# Patient Record
Sex: FEMALE | Race: OTHER | HISPANIC OR LATINO
[De-identification: names, ages, dates, MRNs, and addresses within clinical notes are randomized per-mention and may not be internally consistent; named-entity substitution may affect disease eponyms.]

---

## 2019-09-10 PROBLEM — Z00.00 ENCOUNTER FOR PREVENTIVE HEALTH EXAMINATION: Status: ACTIVE | Noted: 2019-09-10

## 2019-11-25 ENCOUNTER — APPOINTMENT (OUTPATIENT)
Dept: ENDOCRINOLOGY | Facility: CLINIC | Age: 44
End: 2019-11-25
Payer: COMMERCIAL

## 2019-11-25 VITALS
DIASTOLIC BLOOD PRESSURE: 84 MMHG | HEART RATE: 89 BPM | BODY MASS INDEX: 33.66 KG/M2 | TEMPERATURE: 98 F | RESPIRATION RATE: 16 BRPM | SYSTOLIC BLOOD PRESSURE: 122 MMHG | HEIGHT: 63 IN | OXYGEN SATURATION: 96 % | WEIGHT: 190 LBS

## 2019-11-25 DIAGNOSIS — Z86.39 PERSONAL HISTORY OF OTHER ENDOCRINE, NUTRITIONAL AND METABOLIC DISEASE: ICD-10-CM

## 2019-11-25 DIAGNOSIS — Z83.3 FAMILY HISTORY OF DIABETES MELLITUS: ICD-10-CM

## 2019-11-25 DIAGNOSIS — Z83.49 FAMILY HISTORY OF OTHER ENDOCRINE, NUTRITIONAL AND METABOLIC DISEASES: ICD-10-CM

## 2019-11-25 PROCEDURE — 99204 OFFICE O/P NEW MOD 45 MIN: CPT

## 2019-11-25 NOTE — HISTORY OF PRESENT ILLNESS
[FreeTextEntry1] : The patient comes to the office with a history of hypothyroidism for the past 15 years.  She is asymptomatic. She is not feeling tired, or sleepy, no cold intolerance, no muscle cramps, or constipation, she has no dryness of the skin but hair loss. She has not gained weight. She has no history of a goiter or a thyroid nodule. No history of neck radiation. No difficulty swallowing, she gets  hoarse easily. She has been taking Levothyroxine 150 mcg po QD regularly. She has family history of thyroid disease. She has no family history of thyroid cancer. Her periods are irregular.\par \par

## 2019-11-25 NOTE — ASSESSMENT
[FreeTextEntry1] : The patient is clinically euthyroid\par No recent US thyroid\par Will repeat the thyroid tests\par Will repeat the US thyroid\par Will continue with the same treatment in the meantime\par To see Gastroenterologist for the abnormal pancreatic test

## 2019-11-25 NOTE — PHYSICAL EXAM
[Alert] : alert [No Acute Distress] : no acute distress [Normal Sclera/Conjunctiva] : normal sclera/conjunctiva [PERRL] : pupils equal, round and reactive to light [Normal Outer Ear/Nose] : the ears and nose were normal in appearance [Normal Hearing] : hearing was normal [No Neck Mass] : no neck mass was observed [Thyroid Not Enlarged] : the thyroid was not enlarged [No Respiratory Distress] : no respiratory distress [Normal Rate and Effort] : normal respiratory rhythm and effort [Normal PMI] : the apical impulse was normal [Normal Rate] : heart rate was normal  [Carotids Normal] : carotid pulses were normal with no bruits [No Stigmata of Cushings Syndrome] : no stigmata of cushings syndrome [Normal Gait] : normal gait [No Clubbing, Cyanosis] : no clubbing  or cyanosis of the fingernails [No Rash] : no rash [No Skin Lesions] : no skin lesions [Normal Reflexes] : deep tendon reflexes were 2+ and symmetric [No Motor Deficits] : the motor exam was normal

## 2020-01-20 ENCOUNTER — APPOINTMENT (OUTPATIENT)
Dept: ENDOCRINOLOGY | Facility: CLINIC | Age: 45
End: 2020-01-20
Payer: COMMERCIAL

## 2020-01-20 VITALS
DIASTOLIC BLOOD PRESSURE: 80 MMHG | BODY MASS INDEX: 33.31 KG/M2 | SYSTOLIC BLOOD PRESSURE: 120 MMHG | TEMPERATURE: 98.1 F | HEART RATE: 93 BPM | RESPIRATION RATE: 16 BRPM | WEIGHT: 188 LBS | OXYGEN SATURATION: 96 % | HEIGHT: 63 IN

## 2020-01-20 DIAGNOSIS — E03.9 HYPOTHYROIDISM, UNSPECIFIED: ICD-10-CM

## 2020-01-20 PROCEDURE — 99214 OFFICE O/P EST MOD 30 MIN: CPT

## 2020-01-20 RX ORDER — LEVOTHYROXINE SODIUM 0.15 MG/1
150 TABLET ORAL
Qty: 90 | Refills: 3 | Status: ACTIVE | COMMUNITY
Start: 1900-01-01 | End: 1900-01-01

## 2020-01-20 NOTE — ASSESSMENT
[FreeTextEntry1] : The patient is clinically euthyroid\par The hyperlipidemia is not well controlled\par Will start the Atorvastatin 10 mg po QD\par Given a low CHO, low fat diet\par Advised to exercise\par Advised to see head and Neck surgeon for FNA biopsy

## 2020-01-20 NOTE — PHYSICAL EXAM
[Alert] : alert [Normal Sclera/Conjunctiva] : normal sclera/conjunctiva [No Acute Distress] : no acute distress [PERRL] : pupils equal, round and reactive to light [No Neck Mass] : no neck mass was observed [Normal Outer Ear/Nose] : the ears and nose were normal in appearance [Thyroid Not Enlarged] : the thyroid was not enlarged [Normal Hearing] : hearing was normal [No Respiratory Distress] : no respiratory distress [Normal Rate and Effort] : normal respiratory rhythm and effort [Normal PMI] : the apical impulse was normal [Carotids Normal] : carotid pulses were normal with no bruits [Normal Rate] : heart rate was normal  [No Stigmata of Cushings Syndrome] : no stigmata of cushings syndrome [No Rash] : no rash [Normal Gait] : normal gait [No Clubbing, Cyanosis] : no clubbing  or cyanosis of the fingernails [No Skin Lesions] : no skin lesions [No Motor Deficits] : the motor exam was normal [Normal Reflexes] : deep tendon reflexes were 2+ and symmetric

## 2020-01-20 NOTE — HISTORY OF PRESENT ILLNESS
[FreeTextEntry1] : Patient is doing well, denies feeling tired, having cold intolerance, or palpitations. No dryness of the skin but she hair loss. No chest pain or SOB. Taking the Levothyroxine regularly ½ hour before breakfast. Her weight has decreased.  No history of neck radiation. The thyroid tests are within normal limits. The US thyroid revealed a thyroid nodule, advised to have a FNA biopsy. She was advised to see a Head and neck surgeon.\par \par \par

## 2020-01-21 DIAGNOSIS — R73.03 PREDIABETES.: ICD-10-CM

## 2020-12-16 ENCOUNTER — TRANSCRIPTION ENCOUNTER (OUTPATIENT)
Age: 45
End: 2020-12-16

## 2023-09-14 ENCOUNTER — APPOINTMENT (OUTPATIENT)
Dept: BARIATRICS | Facility: CLINIC | Age: 48
End: 2023-09-14
Payer: MEDICAID

## 2023-09-14 ENCOUNTER — NON-APPOINTMENT (OUTPATIENT)
Age: 48
End: 2023-09-14

## 2023-09-14 VITALS
DIASTOLIC BLOOD PRESSURE: 76 MMHG | TEMPERATURE: 97 F | SYSTOLIC BLOOD PRESSURE: 120 MMHG | WEIGHT: 193 LBS | OXYGEN SATURATION: 99 % | HEART RATE: 87 BPM | HEIGHT: 63 IN | BODY MASS INDEX: 34.2 KG/M2

## 2023-09-14 DIAGNOSIS — E78.5 HYPERLIPIDEMIA, UNSPECIFIED: ICD-10-CM

## 2023-09-14 DIAGNOSIS — K29.70 GASTRITIS, UNSPECIFIED, W/OUT BLEEDING: ICD-10-CM

## 2023-09-14 DIAGNOSIS — Z80.3 FAMILY HISTORY OF MALIGNANT NEOPLASM OF BREAST: ICD-10-CM

## 2023-09-14 DIAGNOSIS — K21.9 GASTRO-ESOPHAGEAL REFLUX DISEASE W/OUT ESOPHAGITIS: ICD-10-CM

## 2023-09-14 DIAGNOSIS — Z86.19 PERSONAL HISTORY OF OTHER INFECTIOUS AND PARASITIC DISEASES: ICD-10-CM

## 2023-09-14 DIAGNOSIS — Z78.9 OTHER SPECIFIED HEALTH STATUS: ICD-10-CM

## 2023-09-14 DIAGNOSIS — Z80.0 FAMILY HISTORY OF MALIGNANT NEOPLASM OF DIGESTIVE ORGANS: ICD-10-CM

## 2023-09-14 PROCEDURE — 99205 OFFICE O/P NEW HI 60 MIN: CPT

## 2023-09-14 PROCEDURE — T1013A: CUSTOM

## 2023-09-14 RX ORDER — ATORVASTATIN CALCIUM 10 MG/1
10 TABLET, FILM COATED ORAL
Qty: 90 | Refills: 1 | Status: DISCONTINUED | COMMUNITY
Start: 2020-01-20 | End: 2023-09-14

## 2023-09-14 RX ORDER — OXYBUTYNIN CHLORIDE 10 MG/1
10 TABLET, EXTENDED RELEASE ORAL DAILY
Refills: 0 | Status: ACTIVE | COMMUNITY

## 2023-09-14 RX ORDER — PANTOPRAZOLE 40 MG/1
40 TABLET, DELAYED RELEASE ORAL DAILY
Refills: 0 | Status: ACTIVE | COMMUNITY

## 2023-09-14 RX ORDER — FAMOTIDINE 40 MG/1
40 TABLET, FILM COATED ORAL DAILY
Refills: 0 | Status: ACTIVE | COMMUNITY

## 2023-09-28 DIAGNOSIS — R63.8 OTHER SYMPTOMS AND SIGNS CONCERNING FOOD AND FLUID INTAKE: ICD-10-CM

## 2023-09-28 DIAGNOSIS — E46 UNSPECIFIED PROTEIN-CALORIE MALNUTRITION: ICD-10-CM

## 2023-10-25 ENCOUNTER — APPOINTMENT (OUTPATIENT)
Dept: PLASTIC SURGERY | Facility: CLINIC | Age: 48
End: 2023-10-25
Payer: MEDICAID

## 2023-10-25 VITALS — WEIGHT: 195 LBS | BODY MASS INDEX: 34.55 KG/M2 | OXYGEN SATURATION: 97 % | HEART RATE: 79 BPM | HEIGHT: 63 IN

## 2023-10-25 PROCEDURE — 99204 OFFICE O/P NEW MOD 45 MIN: CPT

## 2023-10-25 RX ORDER — ATORVASTATIN CALCIUM 80 MG/1
TABLET, FILM COATED ORAL
Refills: 0 | Status: ACTIVE | COMMUNITY

## 2023-11-01 ENCOUNTER — APPOINTMENT (OUTPATIENT)
Dept: CT IMAGING | Facility: CLINIC | Age: 48
End: 2023-11-01
Payer: MEDICAID

## 2023-11-01 ENCOUNTER — APPOINTMENT (OUTPATIENT)
Dept: PLASTIC SURGERY | Facility: CLINIC | Age: 48
End: 2023-11-01
Payer: MEDICAID

## 2023-11-01 ENCOUNTER — OUTPATIENT (OUTPATIENT)
Dept: OUTPATIENT SERVICES | Facility: HOSPITAL | Age: 48
LOS: 1 days | End: 2023-11-01

## 2023-11-01 PROCEDURE — 74174 CTA ABD&PLVS W/CONTRAST: CPT | Mod: 26

## 2023-11-01 PROCEDURE — 99214 OFFICE O/P EST MOD 30 MIN: CPT | Mod: 95

## 2023-11-08 ENCOUNTER — TRANSCRIPTION ENCOUNTER (OUTPATIENT)
Age: 48
End: 2023-11-08

## 2023-11-08 VITALS
RESPIRATION RATE: 18 BRPM | TEMPERATURE: 99 F | OXYGEN SATURATION: 95 % | SYSTOLIC BLOOD PRESSURE: 147 MMHG | WEIGHT: 187.61 LBS | HEART RATE: 70 BPM | HEIGHT: 63 IN | DIASTOLIC BLOOD PRESSURE: 93 MMHG

## 2023-11-08 RX ORDER — INFLUENZA VIRUS VACCINE 15; 15; 15; 15 UG/.5ML; UG/.5ML; UG/.5ML; UG/.5ML
0.5 SUSPENSION INTRAMUSCULAR ONCE
Refills: 0 | Status: DISCONTINUED | OUTPATIENT
Start: 2023-11-09 | End: 2023-11-12

## 2023-11-09 ENCOUNTER — RESULT REVIEW (OUTPATIENT)
Age: 48
End: 2023-11-09

## 2023-11-09 ENCOUNTER — INPATIENT (INPATIENT)
Facility: HOSPITAL | Age: 48
LOS: 2 days | Discharge: HOME | DRG: 581 | End: 2023-11-12
Attending: PLASTIC SURGERY | Admitting: PLASTIC SURGERY
Payer: COMMERCIAL

## 2023-11-09 ENCOUNTER — APPOINTMENT (OUTPATIENT)
Dept: NUCLEAR MEDICINE | Facility: HOSPITAL | Age: 48
End: 2023-11-09

## 2023-11-09 ENCOUNTER — TRANSCRIPTION ENCOUNTER (OUTPATIENT)
Age: 48
End: 2023-11-09

## 2023-11-09 DIAGNOSIS — Z98.890 OTHER SPECIFIED POSTPROCEDURAL STATES: Chronic | ICD-10-CM

## 2023-11-09 DIAGNOSIS — D05.10 INTRADUCTAL CARCINOMA IN SITU OF UNSPECIFIED BREAST: ICD-10-CM

## 2023-11-09 DIAGNOSIS — Z90.710 ACQUIRED ABSENCE OF BOTH CERVIX AND UTERUS: Chronic | ICD-10-CM

## 2023-11-09 PROCEDURE — 64912 NRV RPR W/NRV ALGRFT 1ST: CPT | Mod: 80,RT

## 2023-11-09 PROCEDURE — 78195 LYMPH SYSTEM IMAGING: CPT | Mod: 26

## 2023-11-09 PROCEDURE — 88300 SURGICAL PATH GROSS: CPT | Mod: 26,59

## 2023-11-09 PROCEDURE — 15777 ACELLULAR DERM MATRIX IMPLT: CPT | Mod: RT

## 2023-11-09 PROCEDURE — 19364 BRST RCNSTJ FREE FLAP: CPT | Mod: LT

## 2023-11-09 PROCEDURE — 15777 ACELLULAR DERM MATRIX IMPLT: CPT | Mod: LT

## 2023-11-09 PROCEDURE — 88307 TISSUE EXAM BY PATHOLOGIST: CPT | Mod: 26

## 2023-11-09 PROCEDURE — 38530 BIOPSY/REMOVAL LYMPH NODES: CPT | Mod: 80,RT

## 2023-11-09 PROCEDURE — 88360 TUMOR IMMUNOHISTOCHEM/MANUAL: CPT | Mod: 26

## 2023-11-09 PROCEDURE — 88305 TISSUE EXAM BY PATHOLOGIST: CPT | Mod: 26

## 2023-11-09 PROCEDURE — 88342 IMHCHEM/IMCYTCHM 1ST ANTB: CPT | Mod: 26,59

## 2023-11-09 PROCEDURE — 38530 BIOPSY/REMOVAL LYMPH NODES: CPT | Mod: 80,LT

## 2023-11-09 PROCEDURE — 19364 BRST RCNSTJ FREE FLAP: CPT | Mod: RT

## 2023-11-09 PROCEDURE — 64912 NRV RPR W/NRV ALGRFT 1ST: CPT | Mod: 80,LT

## 2023-11-09 DEVICE — CLIP APPLIER ETHICON LIGACLIP 9 3/8" SMALL: Type: IMPLANTABLE DEVICE | Site: BILATERAL | Status: FUNCTIONAL

## 2023-11-09 DEVICE — LIGATING CLIPS SYNOVIS SUPERFINE MICROCLIP 6: Type: IMPLANTABLE DEVICE | Site: BILATERAL | Status: FUNCTIONAL

## 2023-11-09 DEVICE — COUPLER VESSEL ANASTOMOTIC 3MM: Type: IMPLANTABLE DEVICE | Site: BILATERAL | Status: FUNCTIONAL

## 2023-11-09 DEVICE — CARTRIDGE MICROCLIP 30: Type: IMPLANTABLE DEVICE | Site: BILATERAL | Status: FUNCTIONAL

## 2023-11-09 DEVICE — VISTASEAL FIBRIN HUMAN 10ML: Type: IMPLANTABLE DEVICE | Site: BILATERAL | Status: FUNCTIONAL

## 2023-11-09 DEVICE — COUPLER VESSEL ANASTOMOTIC 2.5MM: Type: IMPLANTABLE DEVICE | Site: BILATERAL | Status: FUNCTIONAL

## 2023-11-09 DEVICE — MESH PHASIX 3X8IN: Type: IMPLANTABLE DEVICE | Site: BILATERAL | Status: FUNCTIONAL

## 2023-11-09 DEVICE — GRAFT NERVE CONNECTOR 2X10MM: Type: IMPLANTABLE DEVICE | Site: BILATERAL | Status: FUNCTIONAL

## 2023-11-09 DEVICE — DOPPLER PROBE DISPOSABLE: Type: IMPLANTABLE DEVICE | Site: BILATERAL | Status: FUNCTIONAL

## 2023-11-09 DEVICE — CLIP APPLIER ETHICON LIGACLIP 11.5" MEDIUM: Type: IMPLANTABLE DEVICE | Site: BILATERAL | Status: FUNCTIONAL

## 2023-11-09 RX ORDER — HYDROMORPHONE HYDROCHLORIDE 2 MG/ML
0.5 INJECTION INTRAMUSCULAR; INTRAVENOUS; SUBCUTANEOUS
Refills: 0 | Status: DISCONTINUED | OUTPATIENT
Start: 2023-11-09 | End: 2023-11-12

## 2023-11-09 RX ORDER — APREPITANT 80 MG/1
40 CAPSULE ORAL ONCE
Refills: 0 | Status: COMPLETED | OUTPATIENT
Start: 2023-11-09 | End: 2023-11-09

## 2023-11-09 RX ORDER — ENOXAPARIN SODIUM 100 MG/ML
40 INJECTION SUBCUTANEOUS ONCE
Refills: 0 | Status: DISCONTINUED | OUTPATIENT
Start: 2023-11-09 | End: 2023-11-09

## 2023-11-09 RX ORDER — CELECOXIB 200 MG/1
400 CAPSULE ORAL ONCE
Refills: 0 | Status: COMPLETED | OUTPATIENT
Start: 2023-11-09 | End: 2023-11-09

## 2023-11-09 RX ORDER — ONDANSETRON 8 MG/1
4 TABLET, FILM COATED ORAL EVERY 6 HOURS
Refills: 0 | Status: DISCONTINUED | OUTPATIENT
Start: 2023-11-09 | End: 2023-11-12

## 2023-11-09 RX ORDER — KETOROLAC TROMETHAMINE 30 MG/ML
30 SYRINGE (ML) INJECTION EVERY 6 HOURS
Refills: 0 | Status: COMPLETED | OUTPATIENT
Start: 2023-11-09 | End: 2023-11-12

## 2023-11-09 RX ORDER — ACETAMINOPHEN 500 MG
1000 TABLET ORAL ONCE
Refills: 0 | Status: COMPLETED | OUTPATIENT
Start: 2023-11-09 | End: 2023-11-09

## 2023-11-09 RX ORDER — METOCLOPRAMIDE HCL 10 MG
10 TABLET ORAL EVERY 6 HOURS
Refills: 0 | Status: DISCONTINUED | OUTPATIENT
Start: 2023-11-09 | End: 2023-11-12

## 2023-11-09 RX ORDER — SENNA PLUS 8.6 MG/1
2 TABLET ORAL
Refills: 0 | Status: DISCONTINUED | OUTPATIENT
Start: 2023-11-09 | End: 2023-11-12

## 2023-11-09 RX ORDER — BUPIVACAINE 13.3 MG/ML
20 INJECTION, SUSPENSION, LIPOSOMAL INFILTRATION ONCE
Refills: 0 | Status: DISCONTINUED | OUTPATIENT
Start: 2023-11-09 | End: 2023-11-12

## 2023-11-09 RX ORDER — SODIUM CHLORIDE 9 MG/ML
1000 INJECTION, SOLUTION INTRAVENOUS
Refills: 0 | Status: DISCONTINUED | OUTPATIENT
Start: 2023-11-09 | End: 2023-11-10

## 2023-11-09 RX ORDER — PANTOPRAZOLE SODIUM 20 MG/1
20 TABLET, DELAYED RELEASE ORAL
Refills: 0 | Status: DISCONTINUED | OUTPATIENT
Start: 2023-11-09 | End: 2023-11-12

## 2023-11-09 RX ORDER — CEFAZOLIN SODIUM 1 G
2000 VIAL (EA) INJECTION EVERY 8 HOURS
Refills: 0 | Status: COMPLETED | OUTPATIENT
Start: 2023-11-09 | End: 2023-11-10

## 2023-11-09 RX ORDER — DIAZEPAM 5 MG
5 TABLET ORAL EVERY 8 HOURS
Refills: 0 | Status: DISCONTINUED | OUTPATIENT
Start: 2023-11-09 | End: 2023-11-12

## 2023-11-09 RX ORDER — OXYCODONE HYDROCHLORIDE 5 MG/1
10 TABLET ORAL EVERY 4 HOURS
Refills: 0 | Status: DISCONTINUED | OUTPATIENT
Start: 2023-11-09 | End: 2023-11-12

## 2023-11-09 RX ORDER — GABAPENTIN 400 MG/1
300 CAPSULE ORAL ONCE
Refills: 0 | Status: COMPLETED | OUTPATIENT
Start: 2023-11-09 | End: 2023-11-09

## 2023-11-09 RX ORDER — ACETAMINOPHEN 500 MG
975 TABLET ORAL EVERY 8 HOURS
Refills: 0 | Status: DISCONTINUED | OUTPATIENT
Start: 2023-11-09 | End: 2023-11-12

## 2023-11-09 RX ORDER — OXYCODONE HYDROCHLORIDE 5 MG/1
5 TABLET ORAL EVERY 4 HOURS
Refills: 0 | Status: DISCONTINUED | OUTPATIENT
Start: 2023-11-09 | End: 2023-11-12

## 2023-11-09 RX ORDER — ENOXAPARIN SODIUM 100 MG/ML
40 INJECTION SUBCUTANEOUS EVERY 24 HOURS
Refills: 0 | Status: DISCONTINUED | OUTPATIENT
Start: 2023-11-10 | End: 2023-11-12

## 2023-11-09 RX ORDER — LEVOTHYROXINE SODIUM 125 MCG
150 TABLET ORAL DAILY
Refills: 0 | Status: DISCONTINUED | OUTPATIENT
Start: 2023-11-09 | End: 2023-11-12

## 2023-11-09 RX ADMIN — GABAPENTIN 300 MILLIGRAM(S): 400 CAPSULE ORAL at 07:30

## 2023-11-09 RX ADMIN — Medication 1000 MILLIGRAM(S): at 07:30

## 2023-11-09 RX ADMIN — APREPITANT 40 MILLIGRAM(S): 80 CAPSULE ORAL at 07:35

## 2023-11-09 RX ADMIN — CELECOXIB 400 MILLIGRAM(S): 200 CAPSULE ORAL at 07:29

## 2023-11-09 RX ADMIN — Medication 975 MILLIGRAM(S): at 21:39

## 2023-11-09 RX ADMIN — Medication 100 MILLIGRAM(S): at 19:16

## 2023-11-09 RX ADMIN — Medication 975 MILLIGRAM(S): at 21:42

## 2023-11-09 NOTE — DISCHARGE NOTE PROVIDER - NSDCFUADDINST_GEN_ALL_CORE_FT
*Please refer to the post-operative care instructions provided from Dr. Lerman’s office.    -Follow up with Plastic & Reconstructive Surgeon, Dr. Lerman in 1 week in the office.   -Follow up with Breast Surgeon,  ___ in 1-2 weeks in the office.    -Continue MAXIMILIANO drain care as instructed. (Empty and record the MAXIMILIANO drainage twice daily after discharge. Also, strip/milk the drain tubing each time to minimize clogging. Bring the recorded drain amounts to the office so that it can be reviewed by the physician.)     -Take Aspirin 325mg once daily for 10days.   -Take Percocet & Valium as prescribed for pain control.    -Apply Bacitracin ointment to the belly button twice daily after discharge.    -Wear abdominal binder when out of bed, walking around.     -Diet: no restrictions.     -Showers are permitted the day of discharge. The drains and the incision lines can get wet in the shower. Do not take a bath. Pin the drains to a bathrobe belt or string or a small towel draped over your neck in order that the drains do not dangle from your skin while in the shower. Keep incision sites and MAXIMILIANO drain sites clean & dry after showering.     -No heavy lifting >20 pounds or strenuous exercises.     -Call Doctor’s office or return to ER if: fever (temperature >101.4F), chills, chest pain, shortness of breath, uncontrolled/severe pain, persistent nausea/vomiting, or bleeding/oozing/redness/swelling at incision sites.  	  -For routine questions, call the office ([phone]) weekdays 9:00 A.M. - 5:00 P.M.  For emergencies after business hours, call any time using this same office phone number and the answering service will put you in touch with Dr. Lerman.  Prescriptions were previously sent from Dr. Lerman's office to your pharmacy.    *Please refer to the post-operative care instructions provided from Dr. Lerman’s office.    -Follow up with Plastic & Reconstructive Surgeon, Dr. Lerman in 1 week in the office.   -Follow up with Breast Surgeon, Dr. Agee/Carri in 1-2 weeks in the office.    -Continue MAXIMILIANO drain care as instructed. (Empty and record the MAXIMILIANO drainage twice daily after discharge. Also, strip/milk the drain tubing each time to minimize clogging. Bring the recorded drain amounts to the office so that it can be reviewed by the physician.)     -Take Aspirin 325mg once daily for 10days.   -Take Percocet & Valium as prescribed for pain control.    -Apply Bacitracin ointment to the belly button twice daily after discharge.    -Wear abdominal binder when out of bed, walking around.     -Diet: no restrictions.     -Showers are permitted the day of discharge. The drains and the incision lines can get wet in the shower. Do not take a bath. Pin the drains to a bathrobe belt or string or a small towel draped over your neck in order that the drains do not dangle from your skin while in the shower. Keep incision sites and MAXIMILIANO drain sites clean & dry after showering.     -No heavy lifting >20 pounds or strenuous exercises.     -Call Doctor’s office or return to ER if: fever (temperature >101.4F), chills, chest pain, shortness of breath, uncontrolled/severe pain, persistent nausea/vomiting, or bleeding/oozing/redness/swelling at incision sites.  	  -For routine questions, call the office ([phone]) weekdays 9:00 A.M. - 5:00 P.M.  For emergencies after business hours, call any time using this same office phone number and the answering service will put you in touch with Dr. Lerman.

## 2023-11-09 NOTE — DISCHARGE NOTE PROVIDER - NSDCFUSCHEDAPPT_GEN_ALL_CORE_FT
Lerman, Oren Z  [facility] Physician [facility]  [facility] [address]  Scheduled Appointment: 11/14/2023

## 2023-11-09 NOTE — PRE-ANESTHESIA EVALUATION ADULT - BP NONINVASIVE DIASTOLIC (MM HG)
RT PROGRESS NOTE    VENT DAY# 2    CURRENT SETTINGS:   Ventilation Mode: CMV/AC  (Continuous Mandatory Ventilation/ Assist Control)  FiO2 (%): 40 %  Rate Set (breaths/minute): 20 breaths/min  Tidal Volume Set (mL): 500 mL  PEEP (cm H2O): 8 cmH2O  Oxygen Concentration (%): 40 %  Resp: 20      PATIENT PARAMETERS:  PIP 20  Pplat:  18  Pmean:  12  Compliance: 49  SBT: NO     Secretions:  Scant, breath sounds diminished  02 Sats:  100%    ETT SIZE 7.5 Secured at 23 cm at teeth/gums    Respiratory Medications: none     ABG: @1000 pH 7.27; pCO2 41; pO2 142; HCO3 18, %O2 Sat 99, BE -8.4 on above settings    NOTE / SHIFT SUMMARY:   Continue current vent settings.    Saritha Otero, RT     93

## 2023-11-09 NOTE — H&P ADULT - HISTORY OF PRESENT ILLNESS
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 She denies any complaints preoperatively.

## 2023-11-09 NOTE — DISCHARGE NOTE PROVIDER - HOSPITAL COURSE
49 y/o female underwent bilateral mastectomies and bilateral breast reconstruction with ROXY flaps in the OR. The patient tolerated the procedure well. Postoperatively, the patient was sent to the PACU. The patient's flaps were monitored by Cook doppler and by clinical examination. She transferred to a surgical floor; advanced to a regular diet; was placed on her home medications; the mendoza was removed, and the patient voided appropriately. She ambulated with PT and pain was controlled on PO pain medications.     At the time of discharge, the patient was hemodynamically stable, was tolerating PO diet, was voiding urine, was ambulating, and was comfortable with adequate pain control. The patient was instructed to follow up with Dr. Lerman within x1 week after discharge from the hospital and Dr. Agee within x1 week after discharge from the hospital. The patient/family felt comfortable with discharge. The patient received prescriptions for oral pain medication. The patient had no other issues.

## 2023-11-09 NOTE — DISCHARGE NOTE PROVIDER - CARE PROVIDER_API CALL
Lerman, Oren Zvi  Plastic Surgery  [address], [address]  [place] [address]  Phone: [phone]  Fax: [phone]  Follow Up Time: 1 week    Josiah Agee  Surgery  [address] [place] [address], [place] [address]  Phone: [phone]  Fax: [phone]  Follow Up Time:

## 2023-11-09 NOTE — H&P ADULT - ASSESSMENT
47 y/o female with  hx GERD, hypothyroidism, prediabetes, hyperlipidemia, and DCIS right breast presents for b/l mastectomy and reconstruction with ROXY flaps

## 2023-11-09 NOTE — H&P ADULT - NSICDXPASTMEDICALHX_GEN_ALL_CORE_FT
PAST MEDICAL HISTORY:  Dyslipidemia     GERD (gastroesophageal reflux disease)     History of hypothyroidism

## 2023-11-09 NOTE — H&P ADULT - PROBLEM SELECTOR PLAN 1
- OR today  - admit to plastic surgery ([facility] -telemetry) post op  - npo except ice chips overnight  - IVF  - prn analgesia  - bed rest  - flap checks q 15 min x 1 hr then q 30 min x 2 hrs then q 1 h until 24 h post op

## 2023-11-09 NOTE — DISCHARGE NOTE PROVIDER - NSDCMRMEDTOKEN_GEN_ALL_CORE_FT
levothyroxine 150 mcg (0.15 mg) oral capsule: 1 cap(s) orally once a day  pantoprazole 20 mg oral delayed release tablet: 1 tab(s) orally once a day

## 2023-11-09 NOTE — H&P ADULT - NSHPPHYSICALEXAM_GEN_ALL_CORE
PHYSICAL EXAM:      Constitutional: NAD, alert, awake    Neck: no JVD    Respiratory: unlabored breathing, no respiratory distress    Abdomen: soft, NT, ND    Extremities: no c/c/e    Skin: no rashes or lesions

## 2023-11-09 NOTE — BRIEF OPERATIVE NOTE - NSICDXBRIEFPROCEDURE_GEN_ALL_CORE_FT
PROCEDURES:  Breast reconstruction with ROXY free flap 09-Nov-2023 18:55:31 with bilateral nerve graft Jaylen Fairbanks

## 2023-11-09 NOTE — DISCHARGE NOTE PROVIDER - NSDCCPCAREPLAN_GEN_ALL_CORE_FT
PRINCIPAL DISCHARGE DIAGNOSIS  Diagnosis: Encounter for breast reconstruction following mastectomy  Assessment and Plan of Treatment:

## 2023-11-09 NOTE — PRE-ANESTHESIA EVALUATION ADULT - NSPROPOSEDPROCEDFT_GEN_ALL_CORE
B/L nipple sparing mastectomies.  Injection of blue dye. RIght sentinal lymphadenectomy.  Immediate breast reconstruction with ROXY flap. B/L nipple sparing mastectomies.  Injection of blue dye. RIght sentinal lymphadenectomy.  Immediate b/l breast reconstruction with ROXY flap.

## 2023-11-10 LAB
ANION GAP SERPL CALC-SCNC: 9 MMOL/L — SIGNIFICANT CHANGE UP (ref 5–17)
ANION GAP SERPL CALC-SCNC: 9 MMOL/L — SIGNIFICANT CHANGE UP (ref 5–17)
BUN SERPL-MCNC: 9 MG/DL — SIGNIFICANT CHANGE UP (ref 7–23)
BUN SERPL-MCNC: 9 MG/DL — SIGNIFICANT CHANGE UP (ref 7–23)
CALCIUM SERPL-MCNC: 8.6 MG/DL — SIGNIFICANT CHANGE UP (ref 8.4–10.5)
CALCIUM SERPL-MCNC: 8.6 MG/DL — SIGNIFICANT CHANGE UP (ref 8.4–10.5)
CHLORIDE SERPL-SCNC: 106 MMOL/L — SIGNIFICANT CHANGE UP (ref 96–108)
CHLORIDE SERPL-SCNC: 106 MMOL/L — SIGNIFICANT CHANGE UP (ref 96–108)
CO2 SERPL-SCNC: 23 MMOL/L — SIGNIFICANT CHANGE UP (ref 22–31)
CO2 SERPL-SCNC: 23 MMOL/L — SIGNIFICANT CHANGE UP (ref 22–31)
CREAT SERPL-MCNC: 0.6 MG/DL — SIGNIFICANT CHANGE UP (ref 0.5–1.3)
CREAT SERPL-MCNC: 0.6 MG/DL — SIGNIFICANT CHANGE UP (ref 0.5–1.3)
EGFR: 109 ML/MIN/1.73M2 — SIGNIFICANT CHANGE UP
EGFR: 109 ML/MIN/1.73M2 — SIGNIFICANT CHANGE UP
GLUCOSE SERPL-MCNC: 128 MG/DL — HIGH (ref 70–99)
GLUCOSE SERPL-MCNC: 128 MG/DL — HIGH (ref 70–99)
HCT VFR BLD CALC: 32.4 % — LOW (ref 34.5–45)
HCT VFR BLD CALC: 32.4 % — LOW (ref 34.5–45)
HGB BLD-MCNC: 10.5 G/DL — LOW (ref 11.5–15.5)
HGB BLD-MCNC: 10.5 G/DL — LOW (ref 11.5–15.5)
MCHC RBC-ENTMCNC: 28.8 PG — SIGNIFICANT CHANGE UP (ref 27–34)
MCHC RBC-ENTMCNC: 28.8 PG — SIGNIFICANT CHANGE UP (ref 27–34)
MCHC RBC-ENTMCNC: 32.4 GM/DL — SIGNIFICANT CHANGE UP (ref 32–36)
MCHC RBC-ENTMCNC: 32.4 GM/DL — SIGNIFICANT CHANGE UP (ref 32–36)
MCV RBC AUTO: 89 FL — SIGNIFICANT CHANGE UP (ref 80–100)
MCV RBC AUTO: 89 FL — SIGNIFICANT CHANGE UP (ref 80–100)
NRBC # BLD: 0 /100 WBCS — SIGNIFICANT CHANGE UP (ref 0–0)
NRBC # BLD: 0 /100 WBCS — SIGNIFICANT CHANGE UP (ref 0–0)
PLATELET # BLD AUTO: 228 K/UL — SIGNIFICANT CHANGE UP (ref 150–400)
PLATELET # BLD AUTO: 228 K/UL — SIGNIFICANT CHANGE UP (ref 150–400)
POTASSIUM SERPL-MCNC: 3.9 MMOL/L — SIGNIFICANT CHANGE UP (ref 3.5–5.3)
POTASSIUM SERPL-MCNC: 3.9 MMOL/L — SIGNIFICANT CHANGE UP (ref 3.5–5.3)
POTASSIUM SERPL-SCNC: 3.9 MMOL/L — SIGNIFICANT CHANGE UP (ref 3.5–5.3)
POTASSIUM SERPL-SCNC: 3.9 MMOL/L — SIGNIFICANT CHANGE UP (ref 3.5–5.3)
RBC # BLD: 3.64 M/UL — LOW (ref 3.8–5.2)
RBC # BLD: 3.64 M/UL — LOW (ref 3.8–5.2)
RBC # FLD: 13.2 % — SIGNIFICANT CHANGE UP (ref 10.3–14.5)
RBC # FLD: 13.2 % — SIGNIFICANT CHANGE UP (ref 10.3–14.5)
SODIUM SERPL-SCNC: 138 MMOL/L — SIGNIFICANT CHANGE UP (ref 135–145)
SODIUM SERPL-SCNC: 138 MMOL/L — SIGNIFICANT CHANGE UP (ref 135–145)
WBC # BLD: 9.14 K/UL — SIGNIFICANT CHANGE UP (ref 3.8–10.5)
WBC # BLD: 9.14 K/UL — SIGNIFICANT CHANGE UP (ref 3.8–10.5)
WBC # FLD AUTO: 9.14 K/UL — SIGNIFICANT CHANGE UP (ref 3.8–10.5)
WBC # FLD AUTO: 9.14 K/UL — SIGNIFICANT CHANGE UP (ref 3.8–10.5)

## 2023-11-10 RX ORDER — SODIUM CHLORIDE 9 MG/ML
1000 INJECTION, SOLUTION INTRAVENOUS
Refills: 0 | Status: DISCONTINUED | OUTPATIENT
Start: 2023-11-10 | End: 2023-11-12

## 2023-11-10 RX ADMIN — Medication 975 MILLIGRAM(S): at 05:41

## 2023-11-10 RX ADMIN — Medication 30 MILLIGRAM(S): at 00:25

## 2023-11-10 RX ADMIN — Medication 30 MILLIGRAM(S): at 23:50

## 2023-11-10 RX ADMIN — Medication 975 MILLIGRAM(S): at 13:01

## 2023-11-10 RX ADMIN — SODIUM CHLORIDE 125 MILLILITER(S): 9 INJECTION, SOLUTION INTRAVENOUS at 03:41

## 2023-11-10 RX ADMIN — SENNA PLUS 2 TABLET(S): 8.6 TABLET ORAL at 18:46

## 2023-11-10 RX ADMIN — PANTOPRAZOLE SODIUM 20 MILLIGRAM(S): 20 TABLET, DELAYED RELEASE ORAL at 05:41

## 2023-11-10 RX ADMIN — SENNA PLUS 2 TABLET(S): 8.6 TABLET ORAL at 05:43

## 2023-11-10 RX ADMIN — SODIUM CHLORIDE 100 MILLILITER(S): 9 INJECTION, SOLUTION INTRAVENOUS at 08:57

## 2023-11-10 RX ADMIN — Medication 100 MILLIGRAM(S): at 13:34

## 2023-11-10 RX ADMIN — ENOXAPARIN SODIUM 40 MILLIGRAM(S): 100 INJECTION SUBCUTANEOUS at 23:50

## 2023-11-10 RX ADMIN — Medication 975 MILLIGRAM(S): at 21:33

## 2023-11-10 RX ADMIN — Medication 30 MILLIGRAM(S): at 18:45

## 2023-11-10 RX ADMIN — Medication 150 MICROGRAM(S): at 05:41

## 2023-11-10 RX ADMIN — ENOXAPARIN SODIUM 40 MILLIGRAM(S): 100 INJECTION SUBCUTANEOUS at 00:25

## 2023-11-10 RX ADMIN — Medication 30 MILLIGRAM(S): at 05:43

## 2023-11-10 RX ADMIN — Medication 100 MILLIGRAM(S): at 02:03

## 2023-11-10 RX ADMIN — Medication 30 MILLIGRAM(S): at 11:27

## 2023-11-10 RX ADMIN — SODIUM CHLORIDE 100 MILLILITER(S): 9 INJECTION, SOLUTION INTRAVENOUS at 21:33

## 2023-11-10 NOTE — CONSULT NOTE ADULT - SUBJECTIVE AND OBJECTIVE BOX
HPI: HPI:  47 y/o female with hx GERD, hypothyroidism, prediabetes, hyperlipidemia,  DCIS right breast is sp/p b/l mastectomy and reconstruction with ROXY flaps. Patient at bedside is resting comfortably without SOB complaints, with some pain around incision sights but without CP / palpitations, dizziness, headaches, N/V       ROS: All 12 systems reviewed and negative except for HPI         PAST MEDICAL & SURGICAL HISTORY:  Dyslipidemia      History of hypothyroidism      GERD (gastroesophageal reflux disease)      H/O: hysterectomy      S/P lumpectomy, left breast          Allergies    No Known Allergies    Intolerances        MEDICATIONS  (STANDING):  acetaminophen     Tablet .. 975 milliGRAM(s) Oral every 8 hours  BUpivacaine liposome 1.3% Injectable 20 milliLiter(s) Local Injection once  dextrose 5% + sodium chloride 0.45%. 1000 milliLiter(s) (100 mL/Hr) IV Continuous <Continuous>  enoxaparin Injectable 40 milliGRAM(s) SubCutaneous every 24 hours  influenza   Vaccine 0.5 milliLiter(s) IntraMuscular once  ketorolac   Injectable 30 milliGRAM(s) IV Push every 6 hours  levothyroxine 150 MICROGram(s) Oral daily  pantoprazole    Tablet 20 milliGRAM(s) Oral before breakfast  senna 2 Tablet(s) Oral two times a day    MEDICATIONS  (PRN):  diazepam    Tablet 5 milliGRAM(s) Oral every 8 hours PRN muscle spasms  HYDROmorphone  Injectable 0.5 milliGRAM(s) IV Push every 15 minutes PRN breakthrough pain in the PACU  metoclopramide Injectable 10 milliGRAM(s) IV Push every 6 hours PRN Nausea and/or Vomiting  ondansetron Injectable 4 milliGRAM(s) IV Push every 6 hours PRN Nausea and/or Vomiting  oxyCODONE    IR 5 milliGRAM(s) Oral every 4 hours PRN Moderate Pain (4 - 6)  oxyCODONE    IR 10 milliGRAM(s) Oral every 4 hours PRN Severe Pain (7 - 10)      SOCIAL HISTORY:    FAMILY HISTORY:        Vital Signs Last 24 Hrs  T(C): 37.5 (10 Nov 2023 09:02), Max: 37.5 (10 Nov 2023 09:02)  T(F): 99.5 (10 Nov 2023 09:02), Max: 99.5 (10 Nov 2023 09:02)  HR: 83 (10 Nov 2023 09:02) (83 - 96)  BP: 95/52 (10 Nov 2023 09:02) (94/53 - 112/61)  BP(mean): 71 (09 Nov 2023 21:37) (67 - 77)  RR: 16 (10 Nov 2023 09:02) (12 - 18)  SpO2: 94% (10 Nov 2023 09:02) (94% - 100%)    Parameters below as of 10 Nov 2023 09:02  Patient On (Oxygen Delivery Method): room air        I&O's Summary    09 Nov 2023 07:01  -  10 Nov 2023 07:00  --------------------------------------------------------  IN: 1300 mL / OUT: 1686 mL / NET: -386 mL    10 Nov 2023 07:01  -  10 Nov 2023 12:53  --------------------------------------------------------  IN: 300 mL / OUT: 950 mL / NET: -650 mL        LABS:                        10.5   9.14  )-----------( 228      ( 10 Nov 2023 05:30 )             32.4     11-10    138  |  106  |  9   ----------------------------<  128<H>  3.9   |  23  |  0.60    Ca    8.6      10 Nov 2023 05:30          Urinalysis Basic - ( 10 Nov 2023 05:30 )    Color: x / Appearance: x / SG: x / pH: x  Gluc: 128 mg/dL / Ketone: x  / Bili: x / Urobili: x   Blood: x / Protein: x / Nitrite: x   Leuk Esterase: x / RBC: x / WBC x   Sq Epi: x / Non Sq Epi: x / Bacteria: x      CAPILLARY BLOOD GLUCOSE          Cultures:      PHYSICAL EXAM:  General: NAD, resting comfortably  HEENT: NC/AT, EOMI, normal hearing, no oral lesions, no LAD, neck supple  Pulmonary: Normal resp effort, CTA-B  Cardiovascular: NSR, no murmurs  Abdominal: Soft, ND/NT, no organomegaly  Groin: Soft, nontender, no ecchymosis/hematoma, no erythema, no edema.  Extremities: (+) DP/PT pulses. FROM, normal strength, no clubbing/cyanosis/erythema/edema  Neuro: A/O x 3, CNs II-XII grossly intact, normal sensation, no focal deficits  Pulses: Palpable distal pulses    RADIOLOGY & ADDITIONAL STUDIES:      ASSESSMENT:      PLAN:

## 2023-11-10 NOTE — CONSULT NOTE ADULT - ASSESSMENT
49 y/o female with hx GERD, hypothyroidism, prediabetes, hyperlipidemia,  DCIS right breast is sp/p b/l mastectomy and reconstruction with ROXY flaps. Patient at bedside is resting comfortably without SOB complaints, with some pain around incision sights but without CP / palpitations, dizziness, headaches, N/V. Continue synthyroid, TSH levels can be screened, patient with mild hyperglycemia can check HBA1C, continue Protonix for GERD / GI PPX / Anti-emetics PRN, Pain regimen controlled pain, DVT ppx, carb controlled diet for HLD; patient with normocytic anemia possibly exacerbated post op can check iron profile + ferriting / TIBC, if STACIA can give IV Iron once while inpatient      Thanks for the pleasure of this consult

## 2023-11-10 NOTE — PHYSICAL THERAPY INITIAL EVALUATION ADULT - GENERAL OBSERVATIONS, REHAB EVAL
Pt. received semi supine, +MAXIMILIANO x4, +abdominal prevena, +abdominal binder, +dopplers, +heplock, +bilateral SCDs, NAD, agreeable to PT.

## 2023-11-10 NOTE — PHYSICAL THERAPY INITIAL EVALUATION ADULT - PERTINENT HX OF CURRENT PROBLEM, REHAB EVAL
47 y/o female with hx GERD, hypothyroidism, prediabetes, hyperlipidemia, and DCIS right breast presents for b/l mastectomy and reconstruction with ROXY flaps.

## 2023-11-10 NOTE — PHYSICAL THERAPY INITIAL EVALUATION ADULT - ACTIVE RANGE OF MOTION EXAMINATION, REHAB EVAL
Bilateral UE AROM WFL with exception of shoulder flex/abd limited to 90 degrees secondary to mastectomy precautions/bilateral  lower extremity Active ROM was WFL (within functional limits)

## 2023-11-10 NOTE — PHYSICAL THERAPY INITIAL EVALUATION ADULT - ADDITIONAL COMMENTS
Pt. lives with her  and son in a 3rd floor walkup. At baseline, ambulates independently with no DME.

## 2023-11-11 RX ADMIN — Medication 5 MILLIGRAM(S): at 14:16

## 2023-11-11 RX ADMIN — Medication 975 MILLIGRAM(S): at 13:15

## 2023-11-11 RX ADMIN — Medication 30 MILLIGRAM(S): at 17:07

## 2023-11-11 RX ADMIN — Medication 975 MILLIGRAM(S): at 22:27

## 2023-11-11 RX ADMIN — Medication 975 MILLIGRAM(S): at 05:16

## 2023-11-11 RX ADMIN — PANTOPRAZOLE SODIUM 20 MILLIGRAM(S): 20 TABLET, DELAYED RELEASE ORAL at 05:16

## 2023-11-11 RX ADMIN — SENNA PLUS 2 TABLET(S): 8.6 TABLET ORAL at 17:06

## 2023-11-11 RX ADMIN — ENOXAPARIN SODIUM 40 MILLIGRAM(S): 100 INJECTION SUBCUTANEOUS at 22:27

## 2023-11-11 RX ADMIN — Medication 30 MILLIGRAM(S): at 05:16

## 2023-11-11 RX ADMIN — Medication 30 MILLIGRAM(S): at 11:24

## 2023-11-11 RX ADMIN — Medication 150 MICROGRAM(S): at 05:16

## 2023-11-11 RX ADMIN — SENNA PLUS 2 TABLET(S): 8.6 TABLET ORAL at 05:16

## 2023-11-12 ENCOUNTER — TRANSCRIPTION ENCOUNTER (OUTPATIENT)
Age: 48
End: 2023-11-12

## 2023-11-12 VITALS
RESPIRATION RATE: 17 BRPM | DIASTOLIC BLOOD PRESSURE: 74 MMHG | SYSTOLIC BLOOD PRESSURE: 110 MMHG | HEART RATE: 98 BPM | OXYGEN SATURATION: 96 % | TEMPERATURE: 98 F

## 2023-11-12 PROCEDURE — 88300 SURGICAL PATH GROSS: CPT

## 2023-11-12 PROCEDURE — 97161 PT EVAL LOW COMPLEX 20 MIN: CPT

## 2023-11-12 PROCEDURE — 88341 IMHCHEM/IMCYTCHM EA ADD ANTB: CPT

## 2023-11-12 PROCEDURE — 80048 BASIC METABOLIC PNL TOTAL CA: CPT

## 2023-11-12 PROCEDURE — 86900 BLOOD TYPING SEROLOGIC ABO: CPT

## 2023-11-12 PROCEDURE — 86901 BLOOD TYPING SEROLOGIC RH(D): CPT

## 2023-11-12 PROCEDURE — 36415 COLL VENOUS BLD VENIPUNCTURE: CPT

## 2023-11-12 PROCEDURE — C1781: CPT

## 2023-11-12 PROCEDURE — 88305 TISSUE EXAM BY PATHOLOGIST: CPT

## 2023-11-12 PROCEDURE — C1889: CPT

## 2023-11-12 PROCEDURE — C1763: CPT

## 2023-11-12 PROCEDURE — 88307 TISSUE EXAM BY PATHOLOGIST: CPT

## 2023-11-12 PROCEDURE — A9541: CPT

## 2023-11-12 PROCEDURE — 88360 TUMOR IMMUNOHISTOCHEM/MANUAL: CPT

## 2023-11-12 PROCEDURE — 97116 GAIT TRAINING THERAPY: CPT

## 2023-11-12 PROCEDURE — 86850 RBC ANTIBODY SCREEN: CPT

## 2023-11-12 PROCEDURE — C1762: CPT

## 2023-11-12 PROCEDURE — 78195 LYMPH SYSTEM IMAGING: CPT

## 2023-11-12 PROCEDURE — 85027 COMPLETE CBC AUTOMATED: CPT

## 2023-11-12 RX ADMIN — SENNA PLUS 2 TABLET(S): 8.6 TABLET ORAL at 06:07

## 2023-11-12 RX ADMIN — Medication 975 MILLIGRAM(S): at 06:07

## 2023-11-12 RX ADMIN — Medication 5 MILLIGRAM(S): at 00:23

## 2023-11-12 RX ADMIN — Medication 150 MICROGRAM(S): at 06:07

## 2023-11-12 RX ADMIN — PANTOPRAZOLE SODIUM 20 MILLIGRAM(S): 20 TABLET, DELAYED RELEASE ORAL at 06:07

## 2023-11-12 RX ADMIN — Medication 30 MILLIGRAM(S): at 06:08

## 2023-11-12 RX ADMIN — Medication 30 MILLIGRAM(S): at 00:17

## 2023-11-12 RX ADMIN — Medication 30 MILLIGRAM(S): at 12:28

## 2023-11-12 RX ADMIN — Medication 975 MILLIGRAM(S): at 12:27

## 2023-11-12 NOTE — PROGRESS NOTE ADULT - REASON FOR ADMISSION
R breast DCIS and encounter for breast reconstruction

## 2023-11-12 NOTE — DISCHARGE NOTE NURSING/CASE MANAGEMENT/SOCIAL WORK - NSDCPEFALRISK_GEN_ALL_CORE
For information on Fall & Injury Prevention, visit: [email or web address] OR  [email or web address] OR  https://www.cdc.gov/steadi/patient.html

## 2023-11-12 NOTE — PROGRESS NOTE ADULT - ASSESSMENT
47 y/o female with hx GERD, hypothyroidism, prediabetes, hyperlipidemia,  DCIS right breast is sp/p b/l mastectomy and reconstruction with ROXY flaps. Patient at bedside is resting comfortably without SOB complaints, with some pain around incision sights but without CP / palpitations, dizziness, headaches, N/V. Continue synthyroid, TSH levels can be screened, patient with mild hyperglycemia can check HBA1C, continue Protonix for GERD / GI PPX / Anti-emetics PRN, Pain regimen controlled pain, DVT ppx, carb controlled diet for HLD; patient with normocytic anemia possibly exacerbated post op can check iron profile + ferriting / TIBC, if STACIA can give IV Iron once while inpatient      Thanks for the pleasure of this consult   
48 year old female s/p b/l mastectomies and breast reconstruction with ROXY flaps.    - ancef 24 hours post-op  - q2 hour flap checks today   - telemetry d/c'ed on AM rounds  - d/c mendoza  - DVT prophylaxis  - incentive spirometry  - HOB elevated at 30 degrees  - IVF changed to D5hNS @100cc/hr  - Drain care  - Senna BID  - Nausea, pain control  
49 y/o female with hx GERD, hypothyroidism, prediabetes, hyperlipidemia,  DCIS right breast is sp/p b/l mastectomy and reconstruction with ROXY flaps. Patient at bedside is resting comfortably without SOB complaints, with some pain around incision sights but without CP / palpitations, dizziness, headaches, N/V. Continue synthyroid, TSH levels can be screened, patient with mild hyperglycemia can check HBA1C, continue Protonix for GERD / GI PPX / Anti-emetics PRN, Pain regimen controlled pain, DVT ppx, carb controlled diet for HLD; patient with normocytic anemia possibly exacerbated post op, discharge planning performed well with steps with PT     Thanks for the pleasure of this consult

## 2023-11-12 NOTE — DISCHARGE NOTE NURSING/CASE MANAGEMENT/SOCIAL WORK - PATIENT PORTAL LINK FT
You can access the FollowMyHealth Patient Portal offered by [facility] by registering at the following website: [email or web address]. By joining [employer]’s FollowMyHealth portal, you will also be able to view your health information using other applications (apps) compatible with our system.

## 2023-11-12 NOTE — PROGRESS NOTE ADULT - SUBJECTIVE AND OBJECTIVE BOX
S: KAL overnight. Pain controlled    O:  Flaps warm with appropriate color and cap refill. +doppler signal. Bruising of mastectomy flaps bilaterally  Prevena VAC in place, abdomen soft  Drains ss    A/P: 48yF POD2 s/p immediate b/l ROXY. Doing well.    - q4 hour flap checks   - multimodal pain control  - lovenox   - incentive spirometry  - HOB elevated at 30 degrees  - gen diet  - anticipate home tomorrow
SUBJECTIVE: Patient seen and examined at bedside this morning. No acute events overnight, pain well controlled.     ceFAZolin   IVPB 2000 milliGRAM(s) IV Intermittent every 8 hours  enoxaparin Injectable 40 milliGRAM(s) SubCutaneous every 24 hours    MEDICATIONS  (PRN):  diazepam    Tablet 5 milliGRAM(s) Oral every 8 hours PRN muscle spasms  HYDROmorphone  Injectable 0.5 milliGRAM(s) IV Push every 15 minutes PRN breakthrough pain in the PACU  metoclopramide Injectable 10 milliGRAM(s) IV Push every 6 hours PRN Nausea and/or Vomiting  ondansetron Injectable 4 milliGRAM(s) IV Push every 6 hours PRN Nausea and/or Vomiting  oxyCODONE    IR 5 milliGRAM(s) Oral every 4 hours PRN Moderate Pain (4 - 6)  oxyCODONE    IR 10 milliGRAM(s) Oral every 4 hours PRN Severe Pain (7 - 10)      I&O's Detail    09 Nov 2023 07:01  -  10 Nov 2023 07:00  --------------------------------------------------------  IN:    IV PiggyBack: 50 mL    Lactated Ringers: 1250 mL  Total IN: 1300 mL    OUT:    Bulb (mL): 28 mL    Bulb (mL): 35 mL    Bulb (mL): 50 mL    Bulb (mL): 18 mL    Indwelling Catheter - Urethral (mL): 1555 mL  Total OUT: 1686 mL    Total NET: -386 mL      10 Nov 2023 07:01  -  10 Nov 2023 07:50  --------------------------------------------------------  IN:  Total IN: 0 mL    OUT:    Indwelling Catheter - Urethral (mL): 700 mL  Total OUT: 700 mL    Total NET: -700 mL          T(C): 37.2 (11-10-23 @ 04:45), Max: 37.2 (11-09-23 @ 22:16)  HR: 86 (11-10-23 @ 04:45) (86 - 96)  BP: 101/58 (11-10-23 @ 04:45) (94/53 - 112/61)  RR: 18 (11-10-23 @ 04:45) (12 - 18)  SpO2: 96% (11-10-23 @ 04:45) (95% - 100%)    GENERAL: NAD, Resting comfortably in bed  HEENT: NCAT, MMM, Normal conjunctiva, PERRL  RESP: Nonlabored breathing, No respiratory distress  CARD: Normal rate and rhythm   BREASTS: B/l breasts soft, no collections, skin paddle good color, well perfused, +doppler signal, minimal edema, no erythema or drainage, MAXIMILIANO x2 ss  GI: Soft, no collections, incisions c/d/i; no erythema, purulence or drainage; appropriately TTP. R inferior MAXIMILIANO with SS fluid. L inferior MAXIMILIANO with SS fluid. Prevena in place   EXTREM: WWP, No edema, No gross deformity of extremities  SKIN: No rashes, no lesions  NEURO: AAOx3, No focal motor or sensory deficits  PSYCH: Affect and characteristics of appearance, verbalizations, and behaviors are appropriate    LABS:                        10.5   9.14  )-----------( 228      ( 10 Nov 2023 05:30 )             32.4     11-10    138  |  106  |  9   ----------------------------<  128<H>  3.9   |  23  |  0.60    Ca    8.6      10 Nov 2023 05:30        Urinalysis Basic - ( 10 Nov 2023 05:30 )    Color: x / Appearance: x / SG: x / pH: x  Gluc: 128 mg/dL / Ketone: x  / Bili: x / Urobili: x   Blood: x / Protein: x / Nitrite: x   Leuk Esterase: x / RBC: x / WBC x   Sq Epi: x / Non Sq Epi: x / Bacteria: x        RADIOLOGY & ADDITIONAL STUDIES:    
S: KAL overnight. Pain controlled.     O:  NAD  Flaps warm with appropriate color and cap refill. +doppler signal. Bruising of mastectomy flaps bilaterally  Prevena VAC dressing in place, abdomen soft  Drains ss    A/P: 48yF POD3 s/p immediate b/l ROXY. Doing well.    - q4 hour flap checks   - multimodal pain control  - lovenox   - incentive spirometry  - HOB elevated at 30 degrees  - gen diet  - home today  
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 Patient at bedside is resting comfortably without SOB complaints, with some pain around incision sights but without CP / palpitations, dizziness, headaches, N/V     patient seen and examined today  resting comfortably  doing well with steps son at bedside  discharge planning       ROS: All 12 systems reviewed and negative except for HPI         PAST MEDICAL & SURGICAL HISTORY:  Dyslipidemia      History of hypothyroidism      GERD (gastroesophageal reflux disease)      H/O: hysterectomy      S/P lumpectomy, left breast          Allergies    No Known Allergies    Intolerances    MEDICATIONS  (STANDING):  acetaminophen     Tablet .. 975 milliGRAM(s) Oral every 8 hours  BUpivacaine liposome 1.3% Injectable 20 milliLiter(s) Local Injection once  dextrose 5% + sodium chloride 0.45%. 1000 milliLiter(s) (100 mL/Hr) IV Continuous <Continuous>  enoxaparin Injectable 40 milliGRAM(s) SubCutaneous every 24 hours  influenza   Vaccine 0.5 milliLiter(s) IntraMuscular once  ketorolac   Injectable 30 milliGRAM(s) IV Push every 6 hours  levothyroxine 150 MICROGram(s) Oral daily  pantoprazole    Tablet 20 milliGRAM(s) Oral before breakfast  senna 2 Tablet(s) Oral two times a day    MEDICATIONS  (PRN):  diazepam    Tablet 5 milliGRAM(s) Oral every 8 hours PRN muscle spasms  HYDROmorphone  Injectable 0.5 milliGRAM(s) IV Push every 15 minutes PRN breakthrough pain in the PACU  metoclopramide Injectable 10 milliGRAM(s) IV Push every 6 hours PRN Nausea and/or Vomiting  ondansetron Injectable 4 milliGRAM(s) IV Push every 6 hours PRN Nausea and/or Vomiting  oxyCODONE    IR 5 milliGRAM(s) Oral every 4 hours PRN Moderate Pain (4 - 6)  oxyCODONE    IR 10 milliGRAM(s) Oral every 4 hours PRN Severe Pain (7 - 10)        Vital Signs Last 24 Hrs  T(C): 36.7 (12 Nov 2023 09:25), Max: 37.3 (11 Nov 2023 16:55)  T(F): 98 (12 Nov 2023 09:25), Max: 99.1 (11 Nov 2023 16:55)  HR: 98 (12 Nov 2023 09:25) (87 - 101)  BP: 110/74 (12 Nov 2023 09:25) (101/65 - 111/64)  BP(mean): --  RR: 17 (12 Nov 2023 09:25) (17 - 18)  SpO2: 96% (12 Nov 2023 09:25) (96% - 98%)    Parameters below as of 12 Nov 2023 09:25  Patient On (Oxygen Delivery Method): room air          LABS:                        10.5   9.14  )-----------( 228      ( 10 Nov 2023 05:30 )             32.4     11-10    138  |  106  |  9   ----------------------------<  128<H>  3.9   |  23  |  0.60    Ca    8.6      10 Nov 2023 05:30          Urinalysis Basic - ( 10 Nov 2023 05:30 )    Color: x / Appearance: x / SG: x / pH: x  Gluc: 128 mg/dL / Ketone: x  / Bili: x / Urobili: x   Blood: x / Protein: x / Nitrite: x   Leuk Esterase: x / RBC: x / WBC x   Sq Epi: x / Non Sq Epi: x / Bacteria: x      CAPILLARY BLOOD GLUCOSE          Cultures:      PHYSICAL EXAM:  General: NAD, resting comfortably  HEENT: NC/AT, EOMI, normal hearing, no oral lesions, no LAD, neck supple  Pulmonary: Normal resp effort, CTA-B  Cardiovascular: NSR, no murmurs  Abdominal: Soft, ND/NT, no organomegaly  Groin: Soft, nontender, no ecchymosis/hematoma, no erythema, no edema.  Extremities: (+) DP/PT pulses. FROM, normal strength, no clubbing/cyanosis/erythema/edema  Neuro: A/O x 3, CNs II-XII grossly intact, normal sensation, no focal deficits  Pulses: Palpable distal pulses    RADIOLOGY & ADDITIONAL STUDIES:      ASSESSMENT:      PLAN:        
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 Patient at bedside is resting comfortably without SOB complaints, with some pain around incision sights but without CP / palpitations, dizziness, headaches, N/V     patient seen and examined today  sitting in a chair  participated with PT yesterday, awaiting to do steps today  patient lives in a walk up building with 3-4 flights of stairs, wants discharge monday after working with PT to make sure she can navigate steps     ROS: All 12 systems reviewed and negative except for HPI         PAST MEDICAL & SURGICAL HISTORY:  Dyslipidemia      History of hypothyroidism      GERD (gastroesophageal reflux disease)      H/O: hysterectomy      S/P lumpectomy, left breast          Allergies    No Known Allergies    Intolerances    MEDICATIONS  (STANDING):  acetaminophen     Tablet .. 975 milliGRAM(s) Oral every 8 hours  BUpivacaine liposome 1.3% Injectable 20 milliLiter(s) Local Injection once  dextrose 5% + sodium chloride 0.45%. 1000 milliLiter(s) (100 mL/Hr) IV Continuous <Continuous>  enoxaparin Injectable 40 milliGRAM(s) SubCutaneous every 24 hours  influenza   Vaccine 0.5 milliLiter(s) IntraMuscular once  ketorolac   Injectable 30 milliGRAM(s) IV Push every 6 hours  levothyroxine 150 MICROGram(s) Oral daily  pantoprazole    Tablet 20 milliGRAM(s) Oral before breakfast  senna 2 Tablet(s) Oral two times a day    MEDICATIONS  (PRN):  diazepam    Tablet 5 milliGRAM(s) Oral every 8 hours PRN muscle spasms  HYDROmorphone  Injectable 0.5 milliGRAM(s) IV Push every 15 minutes PRN breakthrough pain in the PACU  metoclopramide Injectable 10 milliGRAM(s) IV Push every 6 hours PRN Nausea and/or Vomiting  ondansetron Injectable 4 milliGRAM(s) IV Push every 6 hours PRN Nausea and/or Vomiting  oxyCODONE    IR 5 milliGRAM(s) Oral every 4 hours PRN Moderate Pain (4 - 6)  oxyCODONE    IR 10 milliGRAM(s) Oral every 4 hours PRN Severe Pain (7 - 10)          I&O's Summary    09 Nov 2023 07:01  -  10 Nov 2023 07:00  --------------------------------------------------------  IN: 1300 mL / OUT: 1686 mL / NET: -386 mL    10 Nov 2023 07:01  -  10 Nov 2023 12:53  Vital Signs Last 24 Hrs  T(C): 37.1 (11 Nov 2023 08:45), Max: 37.2 (10 Nov 2023 14:00)  T(F): 98.7 (11 Nov 2023 08:45), Max: 99 (10 Nov 2023 14:00)  HR: 96 (11 Nov 2023 08:45) (77 - 96)  BP: 121/87 (11 Nov 2023 08:45) (101/55 - 121/87)  BP(mean): --  RR: 18 (11 Nov 2023 08:45) (15 - 18)  SpO2: 98% (11 Nov 2023 08:45) (95% - 98%)    Parameters below as of 11 Nov 2023 08:45  Patient On (Oxygen Delivery Method): room air    --------------------------------------------------------  IN: 300 mL / OUT: 950 mL / NET: -650 mL        LABS:                        10.5   9.14  )-----------( 228      ( 10 Nov 2023 05:30 )             32.4     11-10    138  |  106  |  9   ----------------------------<  128<H>  3.9   |  23  |  0.60    Ca    8.6      10 Nov 2023 05:30          Urinalysis Basic - ( 10 Nov 2023 05:30 )    Color: x / Appearance: x / SG: x / pH: x  Gluc: 128 mg/dL / Ketone: x  / Bili: x / Urobili: x   Blood: x / Protein: x / Nitrite: x   Leuk Esterase: x / RBC: x / WBC x   Sq Epi: x / Non Sq Epi: x / Bacteria: x      CAPILLARY BLOOD GLUCOSE          Cultures:      PHYSICAL EXAM:  General: NAD, resting comfortably  HEENT: NC/AT, EOMI, normal hearing, no oral lesions, no LAD, neck supple  Pulmonary: Normal resp effort, CTA-B  Cardiovascular: NSR, no murmurs  Abdominal: Soft, ND/NT, no organomegaly  Groin: Soft, nontender, no ecchymosis/hematoma, no erythema, no edema.  Extremities: (+) DP/PT pulses. FROM, normal strength, no clubbing/cyanosis/erythema/edema  Neuro: A/O x 3, CNs II-XII grossly intact, normal sensation, no focal deficits  Pulses: Palpable distal pulses    RADIOLOGY & ADDITIONAL STUDIES:      ASSESSMENT:      PLAN:

## 2023-11-14 ENCOUNTER — APPOINTMENT (OUTPATIENT)
Dept: PLASTIC SURGERY | Facility: CLINIC | Age: 48
End: 2023-11-14
Payer: MEDICAID

## 2023-11-14 PROBLEM — E78.5 HYPERLIPIDEMIA, UNSPECIFIED: Chronic | Status: ACTIVE | Noted: 2023-11-08

## 2023-11-14 PROBLEM — Z86.39 PERSONAL HISTORY OF OTHER ENDOCRINE, NUTRITIONAL AND METABOLIC DISEASE: Chronic | Status: ACTIVE | Noted: 2023-11-08

## 2023-11-14 PROBLEM — K21.9 GASTRO-ESOPHAGEAL REFLUX DISEASE WITHOUT ESOPHAGITIS: Chronic | Status: ACTIVE | Noted: 2023-11-08

## 2023-11-14 PROCEDURE — 99024 POSTOP FOLLOW-UP VISIT: CPT

## 2023-11-15 LAB
SURGICAL PATHOLOGY STUDY: SIGNIFICANT CHANGE UP
SURGICAL PATHOLOGY STUDY: SIGNIFICANT CHANGE UP

## 2023-11-20 ENCOUNTER — APPOINTMENT (OUTPATIENT)
Dept: PLASTIC SURGERY | Facility: CLINIC | Age: 48
End: 2023-11-20
Payer: MEDICAID

## 2023-11-20 PROCEDURE — 99024 POSTOP FOLLOW-UP VISIT: CPT

## 2023-11-20 RX ORDER — SILVER SULFADIAZINE 10 MG/G
1 CREAM TOPICAL TWICE DAILY
Qty: 50 | Refills: 0 | Status: ACTIVE | COMMUNITY
Start: 2023-11-20 | End: 1900-01-01

## 2023-11-27 ENCOUNTER — APPOINTMENT (OUTPATIENT)
Dept: PLASTIC SURGERY | Facility: CLINIC | Age: 48
End: 2023-11-27
Payer: MEDICAID

## 2023-11-27 DIAGNOSIS — E78.5 HYPERLIPIDEMIA, UNSPECIFIED: ICD-10-CM

## 2023-11-27 DIAGNOSIS — D05.11 INTRADUCTAL CARCINOMA IN SITU OF RIGHT BREAST: ICD-10-CM

## 2023-11-27 DIAGNOSIS — K21.9 GASTRO-ESOPHAGEAL REFLUX DISEASE WITHOUT ESOPHAGITIS: ICD-10-CM

## 2023-11-27 DIAGNOSIS — E03.9 HYPOTHYROIDISM, UNSPECIFIED: ICD-10-CM

## 2023-11-27 DIAGNOSIS — D50.9 IRON DEFICIENCY ANEMIA, UNSPECIFIED: ICD-10-CM

## 2023-11-27 DIAGNOSIS — R73.03 PREDIABETES: ICD-10-CM

## 2023-11-27 DIAGNOSIS — Z79.890 HORMONE REPLACEMENT THERAPY: ICD-10-CM

## 2023-11-27 PROCEDURE — 99024 POSTOP FOLLOW-UP VISIT: CPT

## 2023-12-08 ENCOUNTER — APPOINTMENT (OUTPATIENT)
Dept: PLASTIC SURGERY | Facility: CLINIC | Age: 48
End: 2023-12-08
Payer: MEDICAID

## 2023-12-08 PROCEDURE — 99024 POSTOP FOLLOW-UP VISIT: CPT

## 2023-12-12 ENCOUNTER — APPOINTMENT (OUTPATIENT)
Dept: PLASTIC SURGERY | Facility: CLINIC | Age: 48
End: 2023-12-12
Payer: MEDICAID

## 2023-12-12 ENCOUNTER — APPOINTMENT (OUTPATIENT)
Dept: PLASTIC SURGERY | Facility: CLINIC | Age: 48
End: 2023-12-12

## 2023-12-12 PROCEDURE — 99024 POSTOP FOLLOW-UP VISIT: CPT

## 2023-12-12 RX ORDER — OXYCODONE 5 MG/1
5 TABLET ORAL
Qty: 12 | Refills: 0 | Status: DISCONTINUED | COMMUNITY
Start: 2023-11-01 | End: 2023-12-12

## 2023-12-12 RX ORDER — ASPIRIN 325 MG/1
325 TABLET, FILM COATED ORAL
Qty: 10 | Refills: 0 | Status: DISCONTINUED | COMMUNITY
Start: 2023-11-01 | End: 2023-12-12

## 2023-12-12 RX ORDER — SULFAMETHOXAZOLE AND TRIMETHOPRIM 800; 160 MG/1; MG/1
800-160 TABLET ORAL TWICE DAILY
Qty: 20 | Refills: 0 | Status: DISCONTINUED | COMMUNITY
Start: 2023-12-11 | End: 2023-12-12

## 2023-12-12 RX ORDER — DIAZEPAM 5 MG/1
5 TABLET ORAL
Qty: 15 | Refills: 0 | Status: DISCONTINUED | COMMUNITY
Start: 2023-11-01 | End: 2023-12-12

## 2023-12-12 RX ORDER — COLLAGENASE SANTYL 250 [ARB'U]/G
250 OINTMENT TOPICAL DAILY
Qty: 1 | Refills: 2 | Status: ACTIVE | COMMUNITY
Start: 2023-12-12 | End: 1900-01-01

## 2023-12-12 RX ORDER — OXYCODONE 5 MG/1
5 TABLET ORAL
Qty: 8 | Refills: 0 | Status: DISCONTINUED | COMMUNITY
Start: 2023-11-20 | End: 2023-12-12

## 2023-12-12 RX ORDER — DIAZEPAM 5 MG/1
5 TABLET ORAL
Qty: 15 | Refills: 0 | Status: DISCONTINUED | COMMUNITY
Start: 2023-11-20 | End: 2023-12-12

## 2023-12-16 NOTE — BRIEF OPERATIVE NOTE - ASSISTANT(S)
[facility] ED NOTE    Patient : Unique Kebede Age: 26 year old Sex: female   MRN: [id number] Encounter Date: 2023    History of Present Illness      Chief Complaint   Patient presents with    Flu Like Symptoms     HPI  Patient is a 26-year-old otherwise healthy female 13 weeks pregnant presented to the emergency department for evaluation of COVID-like symptoms that began yesterday.  Patient states she did not take any meds.  Noting nausea and vomiting.  Patient denying any current abdominal pain.  Patient denying any vaginal bleeding.  Patient denying any  symptoms      Past Medical History     No Known Allergies    Past Medical History:   Diagnosis Date    Bipolar 1 disorder (CMD)     Essential (primary) hypertension     Migraines     Seizures (CMD)     Last Seizue 2023       Past Surgical History:   Procedure Laterality Date    NO PAST SURGERIES         Family History   Problem Relation Age of Onset    Seizure Disorder Mother     Patient is unaware of any medical problems Father        Social History     Tobacco Use    Smoking status: Former     Current packs/day: 0.00     Average packs/day: 0.3 packs/day for 0.3 years (0.1 ttl pk-yrs)     Types: Cigarettes     Start date: 2020     Quit date: 2021     Years since quittin.8    Smokeless tobacco: Never   Vaping Use    Vaping Use: never used   Substance Use Topics    Alcohol use: Not Currently    Drug use: Yes     Types: Cannabinols     Comment: socially       Physical Exam     ED Triage Vitals [23 1006]   ED Triage Vitals Group      Temp 98.2 °F (36.8 °C)      Heart Rate 91      Resp (!) 20      /71      SpO2 96 %      EtCO2 mmHg       Height       Weight 222 lb 14.2 oz (101.1 kg)      Weight Scale Used Standing scale      BMI (Calculated)       IBW/kg (Calculated)        Physical Exam  Vitals and nursing note reviewed.   Constitutional:       General: She is awake. She is not in acute distress.     Appearance: Normal  appearance. She is not ill-appearing.   HENT:      Head: Normocephalic and atraumatic.      Right Ear: External ear normal.      Left Ear: External ear normal.      Neck: Normal range of motion.   Eyes:      Extraocular Movements: Extraocular movements intact.      Conjunctiva/sclera: Conjunctivae normal.   Cardiovascular:      Rate and Rhythm: Normal rate and regular rhythm.   Pulmonary:      Effort: Pulmonary effort is normal.      Breath sounds: Normal breath sounds and air entry.   Musculoskeletal:         General: Normal range of motion.   Skin:     General: Skin is warm.      Capillary Refill: Capillary refill takes less than 2 seconds.   Neurological:      General: No focal deficit present.      Mental Status: She is alert and oriented to person, place, and time.   Psychiatric:         Mood and Affect: Mood normal.         Speech: Speech normal.         Behavior: Behavior normal.         Procedures    ED Medication Orders (From admission, onward)      Ordered Start     Status Ordering Provider    12/16/23 1023 12/16/23 1030  acetaminophen (TYLENOL) tablet 1,000 mg  ONCE         Last MAR action: Given CARYN RICO            Vitals:    12/16/23 1006 12/16/23 1045   BP: 121/71 132/63   BP Location:  LUE - Left upper extremity   Patient Position:  Semi-Lucia's   Pulse: 91 89   Resp: (!) 20 18   Temp: 98.2 °F (36.8 °C)    TempSrc: Tympanic    SpO2: 96% 98%   Weight: 101.1 kg (222 lb 14.2 oz)    LMP: 10/20/2022       Lab Results     Results for orders placed or performed during the hospital encounter of 12/16/23   COVID/Flu/RSV panel   Result Value Ref Range    Rapid SARS-COV-2 by PCR Not Detected Not Detected / Detected / Presumptive Positive / Inhibitors present    Influenza A by PCR Not Detected Not Detected    Influenza B by PCR Not Detected Not Detected    RSV BY PCR Not Detected Not Detected    Isolation Guidelines      Procedural Comment           Radiology Results     Imaging Results    None          Medical Decision Making      Medical Decision Making           26 year old female presents to ED complaining of flulike symptoms/COVID-like symptoms that began yesterday.. Multiple medical diagnosis were considered. HPI and physical exam as documented above.  In the ED patient appeared afebrile, nontoxic, and in no acute distress.  Upon arrival to the ED, patient is tachypneic at 20.  Patient given 1 g of Tylenol in the ED.  Quad swab collected.    Quad swab negative    On examination, patient noting improvement in symptoms after Tylenol.  Swabs were discussed with patient.  Discussed with patient that she may have other viral infection.  Discussed obtaining labs for further evaluation, patient declines at this time.  Discussed importance of increasing fluids.  With patient that she may take Tylenol every 4-6 hours not to exceed more than 4 g of acetaminophen daily.  Advise following up with PCP/OB in the next few days for reassessment.  Strict return precautions were given.  Patient advised to return to the ED for any new or worsening symptoms including intractable nausea vomiting, chest pain, shortness of breath. Patient verbalized understanding and was in agreement with the plan. All questions were answered.   Patient was discharged home in improved condition.  Supportive care discussed with patient in detail.  Patient advised to follow-up with PCP as directed.  Strict return precautions discussed with patient in detail. Return to ED if new or worsening symptoms.    Patient is accompanied by self, who is aiding in history.    Limitations to history/exam/care: none  Co-morbidities impacting care: none/denies  Social factors impacting care: none known   External records reviewed: none available  Tests considered but not performed: N/A  Consults:     Clinical Impression     ED Diagnosis   1. Viral upper respiratory tract infection with cough            Disposition        Discharge 12/16/2023 11:26 AM  Unique  Carmenza Kebede discharge to home/self care.          Candelaria Jackson PA-C   12/16/2023 10:21 AM     New Prescriptions    No medications on file         Employed shared decision making with the patient and all questions answered in my usual fashion. The patient and/or family was counseled on the preliminary diagnosis, treatment, prescription medications (especially for any sedating medications), and instructed on concerning signs and symptoms to return to the ED for further evaluation. Involved parties verbalized their understanding of the instructions given.    The 21st Century Cures Act makes medical notes like these available to patients in the interest of transparency. However, be advised this is a medical document. It is intended as peer to peer communication. It is written in medical language and may contain abbreviations, jargon, and other verbiage that could be misleading or confusing to lay persons. It may appear blunt or direct. Medical documents are intended to carry relevant information, facts as evident, and the clinical opinion of the medical provider.    This note was made using voice dictation and may include inadvertent errors due to the dictation software. Please contact for clarification regarding any noted discrepancies.         Candelaria Jackson PA-C  12/16/23 1514     [place]

## 2023-12-18 NOTE — ASSESSMENT
[FreeTextEntry1] : Healing well Aquaphor to incisions Wet --> dry dressing changes until santyl arrives to right breast wound Start PT  RTC in 2 weeks w/ NP for wound check

## 2023-12-18 NOTE — PHYSICAL EXAM
[de-identified] : Incisions healing well, no collections or s/sx of infection, flaps warm, viable, removed. Right vertical incision sharply debrided - wet to dry dressing changes initiated  [de-identified] : incision healing well, no collections or infection, Umbo viable.

## 2023-12-18 NOTE — SURGICAL HISTORY
[de-identified] : 11/09/2023: possible Axogn nerve graft neuroraphy for resensation, reinforcement of abdominal donor site with biologic mesh W/ Dr. Agee & Dr. Christina

## 2023-12-18 NOTE — HISTORY OF PRESENT ILLNESS
[FreeTextEntry1] : 49 y/o female s/p bilateral ROXY flap breast reconstruction, possible Axogn nerve graft neurography for resensation, reinforcement of abdominal donor site with biologic mesh W/ Dr. Agee & Dr. Christina on 11/09/2023. Denies any f/c/n/v. Patient is taking ibuprofen prn. Patient c/o wound open on her right breast. Scab started to come off on yesterday. Patient is applying aquaphor on breasts and abdomen. Patient called her daughter on her phone to translate.

## 2023-12-19 ENCOUNTER — APPOINTMENT (OUTPATIENT)
Dept: PLASTIC SURGERY | Facility: CLINIC | Age: 48
End: 2023-12-19

## 2023-12-27 ENCOUNTER — APPOINTMENT (OUTPATIENT)
Dept: PLASTIC SURGERY | Facility: CLINIC | Age: 48
End: 2023-12-27
Payer: MEDICAID

## 2023-12-27 PROCEDURE — 99024 POSTOP FOLLOW-UP VISIT: CPT

## 2023-12-27 NOTE — HISTORY OF PRESENT ILLNESS
[FreeTextEntry1] : 49 y/o female s/p bilateral ROXY flap breast reconstruction, possible Axogn nerve graft neurography for resensation, reinforcement of abdominal donor site with biologic mesh W/ Dr. Agee & Dr. Christina on 11/09/2023. Denies any f/c/n/v. Patient is taking ibuprofen prn. Patient c/o wound open on her right breast. Patient is applying santyl on her right breast wound daily. Patient is applying aquaphor on breasts and abdomen. Patient's daughter is here to translate.

## 2023-12-27 NOTE — SURGICAL HISTORY
[de-identified] : 11/09/2023: possible Axogn nerve graft neuroraphy for resensation, reinforcement of abdominal donor site with biologic mesh W/ Dr. Agee & Dr. Christina

## 2023-12-27 NOTE — ASSESSMENT
[FreeTextEntry1] : Healing well Aquaphor to incisions Continue santyl to right breast wound Start PT  RTC in 2 weeks w/ NP for wound check  RTC in 1 month for Dr. Lerman

## 2023-12-27 NOTE — PHYSICAL EXAM
[de-identified] : Incisions healing well, no collections or s/sx of infection, flaps warm, viable, removed. Right vertical incision wound healing well, santyl applied [de-identified] : incision healing well, no collections or infection, Umbo viable.

## 2024-01-01 NOTE — PHYSICAL EXAM
[de-identified] : BL breast mounds soft, no collections or s/sx of infection, ROXY flaps warm, viable. Right breast vertical scar with 3cm x 2cm eschar excised with scissors - wet to dry dressing changes initiated  [de-identified] : incision healing well, no collections or infection, Umbo viable.

## 2024-01-01 NOTE — HISTORY OF PRESENT ILLNESS
[FreeTextEntry1] : 47 y/o female with right breast DCIS presents 18 days s/p bilateral ROXY flap breast reconstruction w/ Dr. Agee & Dr. Christina on 11/09/2023. Denies any f/c/n/v. Patient is taking ibuprofen prn. Patient c/o wound open on her right breast. Scab started to come off on yesterday. Patient is applying aquaphor on breasts and abdomen.

## 2024-01-01 NOTE — ASSESSMENT
[FreeTextEntry1] : small area of dleayed healing, periincisional breakdown and necroiss debrided. Start Wet --> dry dressing changes. Ordered collagenase - santyl - swithc to collagenase when it arrives to right breast wound Start PT  RTC in 2 weeks w/ NP for wound check

## 2024-01-10 ENCOUNTER — APPOINTMENT (OUTPATIENT)
Dept: PLASTIC SURGERY | Facility: CLINIC | Age: 49
End: 2024-01-10
Payer: MEDICAID

## 2024-01-10 DIAGNOSIS — S21.001S UNSPECIFIED OPEN WOUND OF RIGHT BREAST, SEQUELA: ICD-10-CM

## 2024-01-10 PROCEDURE — 99024 POSTOP FOLLOW-UP VISIT: CPT

## 2024-01-10 NOTE — PHYSICAL EXAM
[de-identified] : Incisions healing well, no collections or s/sx of infection, flaps warm, viable, removed. Right vertical incision wound continues to heal well, santyl applied [de-identified] : incision well healed, no collections or infection, Umbo viable.

## 2024-01-10 NOTE — HISTORY OF PRESENT ILLNESS
[FreeTextEntry1] : 49 y/o female s/p bilateral ROXY flap breast reconstruction, possible Axogn nerve graft neurography for resensation, reinforcement of abdominal donor site with biologic mesh W/ Dr. Agee & Dr. Christina on 11/09/2023. Denies any f/c/n/v. Patient is taking ibuprofen prn. Patient is applying santyl on her right breast wound daily. Patient is applying aquaphor on breasts and abdomen. Patient start PT last week with Ella Cortez, she is going twice a week.

## 2024-01-10 NOTE — ASSESSMENT
[FreeTextEntry1] : Healing well Silicone to incisions Continue santyl to right breast wound x 2 weeks, then switch to aquaphor Continue PT  RTC in 1 month for Dr. Lerman

## 2024-01-10 NOTE — SURGICAL HISTORY
[de-identified] : 11/09/2023: possible Axogn nerve graft neuroraphy for resensation, reinforcement of abdominal donor site with biologic mesh W/ Dr. Agee & Dr. Christina

## 2024-02-21 ENCOUNTER — APPOINTMENT (OUTPATIENT)
Dept: PLASTIC SURGERY | Facility: CLINIC | Age: 49
End: 2024-02-21
Payer: MEDICAID

## 2024-02-21 VITALS — WEIGHT: 201.28 LBS | BODY MASS INDEX: 35.66 KG/M2 | HEIGHT: 63 IN

## 2024-02-21 DIAGNOSIS — Z42.1 ENCOUNTER FOR BREAST RECONSTRUCTION FOLLOWING MASTECTOMY: ICD-10-CM

## 2024-02-21 DIAGNOSIS — K65.4 SCLEROSING MESENTERITIS: ICD-10-CM

## 2024-02-21 PROCEDURE — 99214 OFFICE O/P EST MOD 30 MIN: CPT

## 2024-02-28 PROBLEM — Z42.1 ENCOUNTER FOR BREAST RECONSTRUCTION FOLLOWING MASTECTOMY: Status: ACTIVE | Noted: 2023-10-25

## 2024-02-28 PROBLEM — K65.4 FAT NECROSIS OF ABDOMINAL WALL: Status: ACTIVE | Noted: 2024-02-28

## 2024-02-28 NOTE — ASSESSMENT
[FreeTextEntry1] : Well healed, now ready for second stage surgery includin) BL Nipple recon 2) BL revision breast recon with skin envelope reduction - vertical mastopexy 3) excision of suprapubic fat necrosis 4) excision of BL abdominal donor site tethered scar of the love handles and suction lipectomy   reviewed the plan for surgery and the risks and alternatives with the patient who was present with her daughter throughout.  Will schedule outpatient surgery at her convenience.

## 2024-02-28 NOTE — PHYSICAL EXAM
[de-identified] : well healed, Abscence of Nipples, Skin envelope redundancy, / foot print malposition and ptosis [de-identified] : incision well healed, + tethered lateral donor site scar and dogears

## 2024-02-28 NOTE — HISTORY OF PRESENT ILLNESS
[FreeTextEntry1] : 49 y/o female s/p bilateral ROXY flap breast reconstruction,  Axogn nerve graft W/ Dr. Agee & Dr. Christina on 11/09/2023. Denies any f/c/n/v. well healed.  Patient is here to discuss second stage of surgery.

## 2024-03-26 NOTE — H&P ADULT - NSICDXPASTSURGICALHX_GEN_ALL_CORE_FT
I have personally evaluated and examined the patient. The Attending was available to me as a supervising provider if needed. PAST SURGICAL HISTORY:  H/O: hysterectomy     S/P lumpectomy, left breast

## 2024-04-18 ENCOUNTER — APPOINTMENT (OUTPATIENT)
Dept: BARIATRICS | Facility: CLINIC | Age: 49
End: 2024-04-18
Payer: MEDICAID

## 2024-04-18 VITALS
HEART RATE: 78 BPM | TEMPERATURE: 96.7 F | BODY MASS INDEX: 33.66 KG/M2 | SYSTOLIC BLOOD PRESSURE: 120 MMHG | WEIGHT: 190 LBS | HEIGHT: 63 IN | OXYGEN SATURATION: 99 % | DIASTOLIC BLOOD PRESSURE: 72 MMHG

## 2024-04-18 DIAGNOSIS — L71.9 ROSACEA, UNSPECIFIED: ICD-10-CM

## 2024-04-18 PROCEDURE — 99214 OFFICE O/P EST MOD 30 MIN: CPT

## 2024-04-18 NOTE — PHYSICAL EXAM
[Obese, well nourished, in no acute distress] : obese, well nourished, in no acute distress [Normal] : supple without JVD, no thyromegaly or masses appreciated [de-identified] : Equal chest rise, non-labored respirations, no audible wheezing.  [de-identified] : soft, NT, ND, no evidence of hernia or diastasis; obese [de-identified] : ROSIE

## 2024-04-18 NOTE — ASSESSMENT
[FreeTextEntry1] : 49-year-old woman with a longstanding Hx of obesity presents for a weight loss medication follow-up. 3 lb weight loss since last visit 7 months ago; on Phentermine 18.75 mg/day and Topiramate 25 mg/night x 1 month. She was previously prescribed these medications in September 2023, but was being treated for metastatic breast cancer so she did not start at that time. Nutrition and exercise guidelines were reviewed with the patient.    Continue 3 protein-focused meals/day (aim for 60 g - 70 g protein/day) Encourage zero calorie fluid intake (64 oz/day) Exercise with cardio (aim for 8k-10k steps/day) and strength training 2-3x/week Use step counter Weigh yourself 1x-2x/week Try the Calm rama or Soothing Pod rama for stress management Follow-up with nutritionist (keep a food log) Patient will be undergoing another surgical procedure next month for breast cancer. Will increase phentermine to 37.5 mg, but will keep topiramate at 25 mg so she does not have to titrate down prior to her surgery. Follow-up in 6 weeks after breast surgery. Call the office right away with any side effects. Check blood work prior to next appointment   All questions answered   Call with any questions or concerns Time before and after visit spent reviewing chart

## 2024-04-18 NOTE — HISTORY OF PRESENT ILLNESS
[de-identified] : 49-year-old woman with a longstanding Hx of obesity presents for a weight loss medication follow-up. 3 lb weight loss since last visit 7 months ago; on Phentermine 18.75 mg/day and Topiramate 25 mg/night x 1 month. She was previously prescribed these medications in September 2023, but was being treated for metastatic breast cancer so she did not start. Reports no abdominal pain, nausea/vomiting, constipation, diarrhea, reflux/heartburn. Patient eating 3 protein-rich meals/day, drinking adequate zero-calorie fluids/day, and exercising.   Weight at Initial Consult: 193 lbs Current Weight: 190 Anti-Obesity Medication(s): Phentermine-Topiramate Start Date: 3/17/24 Current Dose: Phentermine 18.75 mg/day and Topiramate 25 mg/night Side-Effects from Anti-Obesity Medication(s): none Obesity Comorbidities: Pre-diabetes, HLD Comorbidities Improved/Resolved: N/A History of Bariatric Surgery: No

## 2024-04-18 NOTE — REASON FOR VISIT
[Follow-Up Visit] : a follow-up visit for [Other___] : [unfilled] [[employer] ] : provided by [employer]   [Time Spent: ____ minutes] : Total time spent using  services: [unfilled] minutes. The patient's primary language is not English thus required  services. [Interpreters_IDNumber] : [id number] [TWNoteComboBox1] : Panamanian

## 2024-04-30 ENCOUNTER — TRANSCRIPTION ENCOUNTER (OUTPATIENT)
Age: 49
End: 2024-04-30

## 2024-05-08 LAB
25(OH)D3 SERPL-MCNC: 31.4 NG/ML
ALBUMIN SERPL ELPH-MCNC: 4.4 G/DL
ALP BLD-CCNC: 63 U/L
ALT SERPL-CCNC: 22 U/L
ANION GAP SERPL CALC-SCNC: 10 MMOL/L
AST SERPL-CCNC: 19 U/L
BASOPHILS # BLD AUTO: 0.07 K/UL
BASOPHILS NFR BLD AUTO: 1.3 %
BILIRUB SERPL-MCNC: 0.4 MG/DL
BUN SERPL-MCNC: 8 MG/DL
CALCIUM SERPL-MCNC: 9.4 MG/DL
CHLORIDE SERPL-SCNC: 105 MMOL/L
CHOLEST SERPL-MCNC: 237 MG/DL
CO2 SERPL-SCNC: 24 MMOL/L
CREAT SERPL-MCNC: 0.72 MG/DL
EGFR: 101 ML/MIN/1.73M2
EOSINOPHIL # BLD AUTO: 0.15 K/UL
EOSINOPHIL NFR BLD AUTO: 2.8 %
ESTIMATED AVERAGE GLUCOSE: 126 MG/DL
GLUCOSE SERPL-MCNC: 109 MG/DL
HBA1C MFR BLD HPLC: 6 %
HCG SERPL QL: NEGATIVE
HCG SERPL-MCNC: <1 MIU/ML
HCT VFR BLD CALC: 45.9 %
HDLC SERPL-MCNC: 42 MG/DL
HGB BLD-MCNC: 14.3 G/DL
IMM GRANULOCYTES NFR BLD AUTO: 0.2 %
INSULIN P FAST SERPL-ACNC: 14.3 UU/ML
LDLC SERPL CALC-MCNC: 153 MG/DL
LYMPHOCYTES # BLD AUTO: 1.85 K/UL
LYMPHOCYTES NFR BLD AUTO: 34.1 %
MAN DIFF?: NORMAL
MCHC RBC-ENTMCNC: 26.8 PG
MCHC RBC-ENTMCNC: 31.2 GM/DL
MCV RBC AUTO: 86 FL
MONOCYTES # BLD AUTO: 0.33 K/UL
MONOCYTES NFR BLD AUTO: 6.1 %
NEUTROPHILS # BLD AUTO: 3.02 K/UL
NEUTROPHILS NFR BLD AUTO: 55.5 %
NONHDLC SERPL-MCNC: 195 MG/DL
PAPP-A SERPL-ACNC: <1 MIU/ML
PLATELET # BLD AUTO: 330 K/UL
POTASSIUM SERPL-SCNC: 3.9 MMOL/L
PROT SERPL-MCNC: 7.1 G/DL
RBC # BLD: 5.34 M/UL
RBC # FLD: 15.3 %
SODIUM SERPL-SCNC: 139 MMOL/L
TRIGL SERPL-MCNC: 225 MG/DL
TSH SERPL-ACNC: 0.53 UIU/ML
VIT A SERPL-MCNC: 65.5 UG/DL
VIT B1 SERPL-MCNC: 124 NMOL/L
VIT B12 SERPL-MCNC: 1098 PG/ML
VIT B6 SERPL-MCNC: 90.8 UG/L
WBC # FLD AUTO: 5.43 K/UL
ZINC SERPL-MCNC: 73 UG/DL

## 2024-05-08 RX ORDER — OXYCODONE AND ACETAMINOPHEN 5; 325 MG/1; MG/1
5-325 TABLET ORAL
Qty: 12 | Refills: 0 | Status: ACTIVE | COMMUNITY
Start: 2024-05-08 | End: 1900-01-01

## 2024-05-16 ENCOUNTER — TRANSCRIPTION ENCOUNTER (OUTPATIENT)
Age: 49
End: 2024-05-16

## 2024-05-16 RX ORDER — PANTOPRAZOLE SODIUM 20 MG/1
1 TABLET, DELAYED RELEASE ORAL
Refills: 0 | DISCHARGE

## 2024-05-16 NOTE — ASU PATIENT PROFILE, ADULT - NS PREOP UNDERSTANDS INFO
bring photo id, insurance, credit cards. No alcohol, no drugs, no smoking. Nothing to eat from midnight water till 05:15 AM no candy, no gum, no milk. Must have an escort . Wear loose comfort clothes. No jewelry, no valuables./yes

## 2024-05-16 NOTE — ASU PATIENT PROFILE, ADULT - NSICDXPASTSURGICALHX_GEN_ALL_CORE_FT
PAST SURGICAL HISTORY:  H/O: hysterectomy     S/P lumpectomy, left breast      PAST SURGICAL HISTORY:  H/O: hysterectomy     History of surgery uterine fibroids    S/P lumpectomy, left breast

## 2024-05-16 NOTE — ASU PATIENT PROFILE, ADULT - NSICDXPASTMEDICALHX_GEN_ALL_CORE_FT
PAST MEDICAL HISTORY:  Dyslipidemia     GERD (gastroesophageal reflux disease)     History of hypothyroidism      PAST MEDICAL HISTORY:  Breast cancer     Dyslipidemia     GERD (gastroesophageal reflux disease)     History of hypothyroidism     History of uterine fibroid

## 2024-05-16 NOTE — ASU PATIENT PROFILE, ADULT - FALL HARM RISK - UNIVERSAL INTERVENTIONS
[facility] Complete Bed in lowest position, wheels locked, appropriate side rails in place/Call bell, personal items and telephone in reach/Instruct patient to call for assistance before getting out of bed or chair/Non-slip footwear when patient is out of bed/[place] to call system/Physically safe environment - no spills, clutter or unnecessary equipment/Purposeful Proactive Rounding/Room/bathroom lighting operational, light cord in reach

## 2024-05-17 ENCOUNTER — TRANSCRIPTION ENCOUNTER (OUTPATIENT)
Age: 49
End: 2024-05-17

## 2024-05-17 ENCOUNTER — RESULT REVIEW (OUTPATIENT)
Age: 49
End: 2024-05-17

## 2024-05-17 ENCOUNTER — OUTPATIENT (OUTPATIENT)
Dept: OUTPATIENT SERVICES | Facility: HOSPITAL | Age: 49
LOS: 1 days | Discharge: HOME | End: 2024-05-17
Payer: MEDICAID

## 2024-05-17 VITALS
TEMPERATURE: 97 F | HEART RATE: 67 BPM | WEIGHT: 180.12 LBS | SYSTOLIC BLOOD PRESSURE: 134 MMHG | RESPIRATION RATE: 16 BRPM | OXYGEN SATURATION: 98 % | HEIGHT: 63 IN | DIASTOLIC BLOOD PRESSURE: 85 MMHG

## 2024-05-17 VITALS
RESPIRATION RATE: 18 BRPM | TEMPERATURE: 98 F | DIASTOLIC BLOOD PRESSURE: 65 MMHG | HEART RATE: 80 BPM | OXYGEN SATURATION: 98 % | SYSTOLIC BLOOD PRESSURE: 118 MMHG

## 2024-05-17 DIAGNOSIS — Z98.890 OTHER SPECIFIED POSTPROCEDURAL STATES: Chronic | ICD-10-CM

## 2024-05-17 DIAGNOSIS — Z90.710 ACQUIRED ABSENCE OF BOTH CERVIX AND UTERUS: Chronic | ICD-10-CM

## 2024-05-17 PROCEDURE — 19380 REVJ RECONSTRUCTED BREAST: CPT | Mod: 50

## 2024-05-17 PROCEDURE — 21930 EXC BACK LES SC < 3 CM: CPT

## 2024-05-17 PROCEDURE — 88305 TISSUE EXAM BY PATHOLOGIST: CPT | Mod: 26

## 2024-05-17 PROCEDURE — 15877 SUCTION LIPECTOMY TRUNK: CPT | Mod: 59

## 2024-05-17 PROCEDURE — 88302 TISSUE EXAM BY PATHOLOGIST: CPT | Mod: 26

## 2024-05-17 PROCEDURE — 19350 NIPPLE/AREOLA RECONSTRUCTION: CPT | Mod: 50

## 2024-05-17 RX ORDER — DIPHENHYDRAMINE HCL 50 MG
12.5 CAPSULE ORAL ONCE
Refills: 0 | Status: DISCONTINUED | OUTPATIENT
Start: 2024-05-17 | End: 2024-05-17

## 2024-05-17 RX ORDER — FAMOTIDINE 10 MG/ML
1 INJECTION INTRAVENOUS
Refills: 0 | DISCHARGE

## 2024-05-17 RX ORDER — APREPITANT 80 MG/1
40 CAPSULE ORAL ONCE
Refills: 0 | Status: COMPLETED | OUTPATIENT
Start: 2024-05-17 | End: 2024-05-17

## 2024-05-17 RX ORDER — LEVOTHYROXINE SODIUM 125 MCG
1 TABLET ORAL
Refills: 0 | DISCHARGE

## 2024-05-17 RX ORDER — ACETAMINOPHEN 500 MG
1000 TABLET ORAL ONCE
Refills: 0 | Status: COMPLETED | OUTPATIENT
Start: 2024-05-17 | End: 2024-05-17

## 2024-05-17 RX ORDER — FENTANYL CITRATE 50 UG/ML
25 INJECTION INTRAVENOUS
Refills: 0 | Status: DISCONTINUED | OUTPATIENT
Start: 2024-05-17 | End: 2024-05-17

## 2024-05-17 RX ORDER — ONDANSETRON 8 MG/1
4 TABLET, FILM COATED ORAL ONCE
Refills: 0 | Status: DISCONTINUED | OUTPATIENT
Start: 2024-05-17 | End: 2024-05-17

## 2024-05-17 RX ORDER — METOCLOPRAMIDE HCL 10 MG
10 TABLET ORAL ONCE
Refills: 0 | Status: DISCONTINUED | OUTPATIENT
Start: 2024-05-17 | End: 2024-05-17

## 2024-05-17 RX ORDER — HYDROMORPHONE HYDROCHLORIDE 2 MG/ML
0.5 INJECTION INTRAMUSCULAR; INTRAVENOUS; SUBCUTANEOUS
Refills: 0 | Status: DISCONTINUED | OUTPATIENT
Start: 2024-05-17 | End: 2024-05-17

## 2024-05-17 RX ORDER — SODIUM CHLORIDE 9 MG/ML
500 INJECTION, SOLUTION INTRAVENOUS
Refills: 0 | Status: DISCONTINUED | OUTPATIENT
Start: 2024-05-17 | End: 2024-05-17

## 2024-05-17 RX ADMIN — FENTANYL CITRATE 25 MICROGRAM(S): 50 INJECTION INTRAVENOUS at 14:32

## 2024-05-17 RX ADMIN — Medication 1000 MILLIGRAM(S): at 08:25

## 2024-05-17 RX ADMIN — FENTANYL CITRATE 25 MICROGRAM(S): 50 INJECTION INTRAVENOUS at 14:17

## 2024-05-17 RX ADMIN — APREPITANT 40 MILLIGRAM(S): 80 CAPSULE ORAL at 08:25

## 2024-05-17 RX ADMIN — FENTANYL CITRATE 25 MICROGRAM(S): 50 INJECTION INTRAVENOUS at 14:33

## 2024-05-17 RX ADMIN — FENTANYL CITRATE 25 MICROGRAM(S): 50 INJECTION INTRAVENOUS at 14:48

## 2024-05-17 RX ADMIN — SODIUM CHLORIDE 100 MILLILITER(S): 9 INJECTION, SOLUTION INTRAVENOUS at 13:40

## 2024-05-17 NOTE — ASU DISCHARGE PLAN (ADULT/PEDIATRIC) - ASU DC SPECIAL INSTRUCTIONSFT
You may shower tomorrow. Take medications as instructed. Follow up as scheduled with Dr Ambrose You may shower tomorrow. Take medications as instructed. Follow up as scheduled with Dr Lerman You may shower tomorrow. Take medications as instructed. Follow up as scheduled with Dr Lerman    Take over the counter ibuprofen (Motrin) and acetaminophen (Tylenol) as needed for pain. Alternating these medications every 3 hours may help with pain coverage.

## 2024-05-17 NOTE — BRIEF OPERATIVE NOTE - NSICDXBRIEFPROCEDURE_GEN_ALL_CORE_FT
PROCEDURES:  Revision, placement, implant, breast, bilateral 17-May-2024 11:59:16  Rick Gonzalez   PROCEDURES:  Revision, reconstructed breast 17-May-2024 13:11:27  Mitesh Cuevas

## 2024-05-17 NOTE — ASU DISCHARGE PLAN (ADULT/PEDIATRIC) - NS MD DC FALL RISK RISK
For information on Fall & Injury Prevention, visit: [email or web address] OR  [email or web address] OR  https://www.cdc.gov/steadi/patient.html

## 2024-05-20 ENCOUNTER — APPOINTMENT (OUTPATIENT)
Dept: BARIATRICS | Facility: CLINIC | Age: 49
End: 2024-05-20
Payer: MEDICAID

## 2024-05-20 VITALS — BODY MASS INDEX: 32.25 KG/M2 | HEIGHT: 63 IN | WEIGHT: 182 LBS

## 2024-05-20 DIAGNOSIS — E66.9 OBESITY, UNSPECIFIED: ICD-10-CM

## 2024-05-20 PROCEDURE — 99214 OFFICE O/P EST MOD 30 MIN: CPT | Mod: 95

## 2024-05-20 RX ORDER — PHENTERMINE HCL 18.75MG
18.75 CAPSULE ORAL
Refills: 0 | Status: DISCONTINUED | COMMUNITY
End: 2024-05-20

## 2024-05-20 RX ORDER — TOPIRAMATE 25 MG/1
25 TABLET, FILM COATED ORAL
Qty: 30 | Refills: 1 | Status: ACTIVE | COMMUNITY
Start: 2023-09-28 | End: 1900-01-01

## 2024-05-20 RX ORDER — PHENTERMINE HYDROCHLORIDE 37.5 MG/1
37.5 TABLET ORAL
Qty: 30 | Refills: 0 | Status: ACTIVE | COMMUNITY
Start: 2023-09-28 | End: 1900-01-01

## 2024-05-20 RX ORDER — TOPIRAMATE 25 MG/1
25 TABLET, FILM COATED ORAL
Qty: 30 | Refills: 0 | Status: ACTIVE | COMMUNITY

## 2024-05-20 NOTE — ASSESSMENT
[FreeTextEntry1] : 49-year-old woman with a longstanding Hx of obesity presents for a weight loss medication follow-up. 8 lb weight loss since last visit 1 month ago. She underwent breast surgery 5/17/24 so has been off phentermine and topiramate for 1 week. She wanted to discuss when she can restart it at her visit today. Nutrition and exercise guidelines were reviewed with the patient.    Continue 3 protein-focused meals/day (aim for 60 g - 70 g protein/day), avoid skipping meals Encourage zero calorie fluid intake (64 oz/day) Exercise with cardio (aim for 8k-10k steps/day) and strength training 2-3x/week Use step counter Weigh yourself 1x-2x/week Try the Calm rama or Soothing Pod rama for stress management Follow-up with nutritionist (keep a food log) Patient cleared to restart phentermine 37.5 mg, but she was instructed to hold off on taking topiramate until she is no longer taking oxycodone.  Call the office right away with any side effects. Last blood work 4/2024, next due 10/2024. Noted with BELEN, preDM   All questions answered   Call with any questions or concerns Time before and after visit spent reviewing chart

## 2024-05-20 NOTE — PHYSICAL EXAM
[Obese, well nourished, in no acute distress] : obese, well nourished, in no acute distress [Normal] : affect appropriate [de-identified] : Equal chest rise, non-labored respirations, no audible wheezing.  [de-identified] : deferred due to telehealth visit [de-identified] : ROSIE

## 2024-05-20 NOTE — REASON FOR VISIT
[Home] : at home, [unfilled] , at the time of the visit. [Medical Office: ([facility])___] : at the medical office located in  [Other:____] : [unfilled] [Patient] : the patient [Self] : self [Follow-Up Visit] : a follow-up visit for [Other___] : [unfilled]

## 2024-05-20 NOTE — HISTORY OF PRESENT ILLNESS
[de-identified] : 49-year-old woman with a longstanding Hx of obesity presents for a weight loss medication follow-up. 8 lb weight loss since last visit 1 month ago. She underwent breast surgery 5/17/24 so has been off phentermine and topiramate for 1 week. She wanted to discuss when she can restart it at her visit today. Currently she takes percocet at night for post surgical pain. Reports no abdominal pain, nausea/vomiting, constipation, diarrhea, reflux/heartburn, anxiety, palpitations, tingling in the hands or feet. Patient trying to eat 3 protein-rich meals/day (admits to skipping lunch frequently), drinking adequate zero-calorie fluids/day, and exercising by walking and strength training 2x per week.   Weight at Initial Consult: 193 lbs Current Weight: 182 Anti-Obesity Medication(s): Phentermine-Topiramate Start Date: 3/17/24 Current Dose: Phentermine 37.5 mg/day and Topiramate 25 mg/night Side-Effects from Anti-Obesity Medication(s): none Obesity Comorbidities: Pre-diabetes, HLD Comorbidities Improved/Resolved: N/A History of Bariatric Surgery: No

## 2024-05-21 ENCOUNTER — APPOINTMENT (OUTPATIENT)
Dept: PLASTIC SURGERY | Facility: CLINIC | Age: 49
End: 2024-05-21
Payer: MEDICAID

## 2024-05-21 DIAGNOSIS — D05.11 INTRADUCTAL CARCINOMA IN SITU OF RIGHT BREAST: ICD-10-CM

## 2024-05-21 PROCEDURE — 99024 POSTOP FOLLOW-UP VISIT: CPT

## 2024-05-22 PROBLEM — Z86.018 PERSONAL HISTORY OF OTHER BENIGN NEOPLASM: Chronic | Status: ACTIVE | Noted: 2024-05-17

## 2024-05-22 PROBLEM — C50.919 MALIGNANT NEOPLASM OF UNSPECIFIED SITE OF UNSPECIFIED FEMALE BREAST: Chronic | Status: ACTIVE | Noted: 2024-05-17

## 2024-05-28 ENCOUNTER — APPOINTMENT (OUTPATIENT)
Dept: PLASTIC SURGERY | Facility: CLINIC | Age: 49
End: 2024-05-28
Payer: MEDICAID

## 2024-05-28 DIAGNOSIS — M95.8 OTHER SPECIFIED ACQUIRED DEFORMITIES OF MUSCULOSKELETAL SYSTEM: ICD-10-CM

## 2024-05-28 PROBLEM — D05.11 DUCTAL CARCINOMA IN SITU (DCIS) OF RIGHT BREAST: Status: ACTIVE | Noted: 2023-10-25

## 2024-05-28 PROCEDURE — 99024 POSTOP FOLLOW-UP VISIT: CPT

## 2024-05-28 NOTE — ASSESSMENT
[FreeTextEntry1] : Healing well.  I reviewed the postoperative care instructions she should follow-up in 3 to 4 weeks

## 2024-05-28 NOTE — PHYSICAL EXAM
[de-identified] : The bilateral breast mounds are soft the incision lines are clean dry and intact the nipple constructs are viable there is no sign of any infection or collection [de-identified] : Improved abdominal donor site contour no infection or collection

## 2024-05-28 NOTE — SURGICAL HISTORY
[de-identified] : 11/09/2023: possible Axogn nerve graft neuroraphy for resensation, reinforcement of abdominal donor site with biologic mesh W/ Dr. Agee & Dr. Christina [de-identified] : 05/17/2024: remove Doppler wires, revision bilateral ROXY flap breast reconstruction with mastopexy, bilateral nipple recon, excision of abdominal dog ears, and fat necrosis, liposuction abdominal donor site.

## 2024-05-28 NOTE — HISTORY OF PRESENT ILLNESS
[FreeTextEntry1] : 48 y/o female presents 10 days s/p remove Doppler wires, revision bilateral ROXY flap breast reconstruction with mastopexy, bilateral nipple recon, excision of abdominal dog ears, and fat necrosis, liposuction abdominal donor site. Denies any f/c/n/v. Patient c/o pulling in her left breast. Patient is here for suture removal.

## 2024-05-28 NOTE — ASSESSMENT
[FreeTextEntry1] : 10 days s/p bilateral breast recon revision, healing well Bilateral periareolar sutures removed Aquaphor to incisions Allow steris to fall off Shower ok RTC in 3 weeks for Dr Lerman

## 2024-05-28 NOTE — HISTORY OF PRESENT ILLNESS
[FreeTextEntry1] : 50 y/o female presents 4 days s/p revision bilateral ROXY flap breast reconstruction with mastopexy, bilateral nipple recon, excision of abdominal dog ears, and fat necrosis, liposuction abdominal donor site. Denies any f/c/n/v. Patient is taking oxycodone at night. Patient c/o pulling in her left breast.  Patient's daughter is on call for translating.

## 2024-05-28 NOTE — PHYSICAL EXAM
[de-identified] : Incisions c/d/i, no collections or s/sx of infection, b/l periareolar sutures removed, nipples viable, mild ischemia to left nipple lateral aspect [de-identified] : Incisions c/d/i, no collections or s/sx of infection

## 2024-05-28 NOTE — SURGICAL HISTORY
[de-identified] : 11/09/2023: possible Axogn nerve graft neuroraphy for resensation, reinforcement of abdominal donor site with biologic mesh W/ Dr. Agee & Dr. Christina [de-identified] : 05/17/2024: revision bilateral ROXY flap breast reconstruction with mastopexy, bilateral nipple recon, excision of abdominal dog ears, and fat necrosis, liposuction abdominal donor site.

## 2024-05-29 LAB — SURGICAL PATHOLOGY STUDY: SIGNIFICANT CHANGE UP

## 2024-06-11 ENCOUNTER — APPOINTMENT (OUTPATIENT)
Dept: PLASTIC SURGERY | Facility: CLINIC | Age: 49
End: 2024-06-11
Payer: MEDICAID

## 2024-06-11 PROCEDURE — 99024 POSTOP FOLLOW-UP VISIT: CPT

## 2024-06-11 NOTE — HISTORY OF PRESENT ILLNESS
[FreeTextEntry1] : 50 y/o female presents 25 days s/p remove Doppler wires, revision bilateral ROXY flap breast reconstruction with mastopexy, bilateral nipple recon, excision of abdominal dog ears, and fat necrosis, liposuction abdominal donor site on 05/17/2024. Denies any f/c/n/v. Patient is applying Aquaphor. Patient c/o scab on her left nipple, she states it bleeds sometimes. She first noticed it on Friday.  Patient's daughter is on the phone to translate.

## 2024-06-11 NOTE — SURGICAL HISTORY
[de-identified] : 11/09/2023: possible Axogn nerve graft neuroraphy for resensation, reinforcement of abdominal donor site with biologic mesh W/ Dr. Agee & Dr. Christina [de-identified] : 05/17/2024: remove Doppler wires, revision bilateral ROXY flap breast reconstruction with mastopexy, bilateral nipple recon, excision of abdominal dog ears, and fat necrosis, liposuction abdominal donor site.

## 2024-06-11 NOTE — PHYSICAL EXAM
[de-identified] : Incisions c/d/i, no collections or s/sx of infection, b/l periareolar sutures removed, right nipple construct viable, distal  tip necrosis left nipple ~30% loss  [de-identified] : Incisions c/d/i, no collections or s/sx of infection

## 2024-06-11 NOTE — ASSESSMENT
[FreeTextEntry1] : 10 days s/p bilateral breast recon revision and nipple reconstruction, healing well except there is distal tip necrosis partial loss of the left reconstructed nipple this will demarcate and heal secondarily she will lose some of the volume of the nipple construct but she still has approximately 70% remaining and the final step of tattooing will cover the scars and give her good symmetry.   We reviewed wound care for the left nipple  Aquaphor to incisions Shower regularly with soap and water and keep clean Follow-up in 2 weeks with nurse practitioner Isadora Bishop RTC in 4 weeks with Dr Lerman

## 2024-06-18 ENCOUNTER — APPOINTMENT (OUTPATIENT)
Dept: PLASTIC SURGERY | Facility: CLINIC | Age: 49
End: 2024-06-18
Payer: MEDICAID

## 2024-06-18 DIAGNOSIS — N65.0 DEFORMITY OF RECONSTRUCTED BREAST: ICD-10-CM

## 2024-06-18 PROCEDURE — 99024 POSTOP FOLLOW-UP VISIT: CPT

## 2024-06-18 PROCEDURE — 11042 DBRDMT SUBQ TIS 1ST 20SQCM/<: CPT

## 2024-06-18 NOTE — PROCEDURE
[FreeTextEntry6] : Left nipple construct with distal tip ischemic necrosis and demarcation. I performed excisional debridement of the eschar down to well vascularized bleeding tissue with scissors. Total defect 1.5 cm. She has 75% of the residual nipple construct viable and intact. Aquaphor applied.

## 2024-06-18 NOTE — SURGICAL HISTORY
[de-identified] : 11/09/2023: possible Axogn nerve graft neuroraphy for resensation, reinforcement of abdominal donor site with biologic mesh W/ Dr. Agee & Dr. Christina [de-identified] : 05/17/2024: remove Doppler wires, revision bilateral ROXY flap breast reconstruction with mastopexy, bilateral nipple recon, excision of abdominal dog ears, and fat necrosis, liposuction abdominal donor site.

## 2024-06-18 NOTE — HISTORY OF PRESENT ILLNESS
[FreeTextEntry1] : 48 y/o female presents 25 days s/p remove Doppler wires, revision bilateral ROXY flap breast reconstruction with mastopexy, bilateral nipple recon, excision of abdominal dog ears, and fat necrosis, liposuction abdominal donor site on 05/17/2024. Denies any f/c/n/v. Patient is applying Aquaphor to left nipple. Patient c/o scab on her left nipple.

## 2024-06-18 NOTE — ASSESSMENT
[FreeTextEntry1] : 3 1/2 weeks s/p bilateral breast recon revision and nipple reconstruction, healing well except there is distal tip necrosis partial loss of the left reconstructed nipple. This was debrided today, she still has approximately 70% remaining and the final step of tattooing will cover the scars and give her good symmetry.   We reviewed wound care for the left nipple  Aquaphor to incisions RTC in 3 weeks with Dr Lerman

## 2024-06-18 NOTE — PHYSICAL EXAM
[de-identified] : Incisions healing well, no collections or s/sx of infection, right nipple construct viable, distal  tip necrosis left nipple ~30% loss, debrided with scissors [de-identified] : Incisions healing well, no collections or s/sx of infection

## 2024-07-09 ENCOUNTER — APPOINTMENT (OUTPATIENT)
Dept: PLASTIC SURGERY | Facility: CLINIC | Age: 49
End: 2024-07-09
Payer: MEDICAID

## 2024-07-09 DIAGNOSIS — N65.0 DEFORMITY OF RECONSTRUCTED BREAST: ICD-10-CM

## 2024-07-09 PROCEDURE — 99024 POSTOP FOLLOW-UP VISIT: CPT

## 2024-07-16 ENCOUNTER — APPOINTMENT (OUTPATIENT)
Dept: BARIATRICS | Facility: CLINIC | Age: 49
End: 2024-07-16

## 2024-09-10 ENCOUNTER — APPOINTMENT (OUTPATIENT)
Dept: BARIATRICS | Facility: CLINIC | Age: 49
End: 2024-09-10
Payer: MEDICAID

## 2024-09-10 VITALS
SYSTOLIC BLOOD PRESSURE: 112 MMHG | DIASTOLIC BLOOD PRESSURE: 77 MMHG | BODY MASS INDEX: 31.25 KG/M2 | WEIGHT: 176.37 LBS | TEMPERATURE: 97.8 F | OXYGEN SATURATION: 95 % | HEIGHT: 63 IN | HEART RATE: 84 BPM

## 2024-09-10 DIAGNOSIS — E61.8 DEFICIENCY OF OTHER SPECIFIED NUTRIENT ELEMENTS: ICD-10-CM

## 2024-09-10 DIAGNOSIS — R63.8 OTHER SYMPTOMS AND SIGNS CONCERNING FOOD AND FLUID INTAKE: ICD-10-CM

## 2024-09-10 DIAGNOSIS — R63.2 POLYPHAGIA: ICD-10-CM

## 2024-09-10 DIAGNOSIS — Z00.00 ENCOUNTER FOR GENERAL ADULT MEDICAL EXAMINATION W/OUT ABNORMAL FINDINGS: ICD-10-CM

## 2024-09-10 DIAGNOSIS — Z01.812 ENCOUNTER FOR PREPROCEDURAL LABORATORY EXAMINATION: ICD-10-CM

## 2024-09-10 DIAGNOSIS — E56.9 VITAMIN DEFICIENCY, UNSPECIFIED: ICD-10-CM

## 2024-09-10 DIAGNOSIS — E66.9 OBESITY, UNSPECIFIED: ICD-10-CM

## 2024-09-10 DIAGNOSIS — R79.9 ABNORMAL FINDING OF BLOOD CHEMISTRY, UNSPECIFIED: ICD-10-CM

## 2024-09-10 DIAGNOSIS — E46 UNSPECIFIED PROTEIN-CALORIE MALNUTRITION: ICD-10-CM

## 2024-09-10 DIAGNOSIS — R63.5 ABNORMAL WEIGHT GAIN: ICD-10-CM

## 2024-09-10 DIAGNOSIS — E03.9 HYPOTHYROIDISM, UNSPECIFIED: ICD-10-CM

## 2024-09-10 PROCEDURE — 99214 OFFICE O/P EST MOD 30 MIN: CPT

## 2024-09-11 PROBLEM — R63.2 CALORIE OVERLOAD: Status: ACTIVE | Noted: 2024-09-11

## 2024-09-11 PROBLEM — E56.9 VITAMIN DEFICIENCY, UNSPECIFIED: Status: ACTIVE | Noted: 2024-09-11

## 2024-09-11 PROBLEM — E03.9 HYPOTHYROIDISM, UNSPECIFIED TYPE: Status: ACTIVE | Noted: 2019-11-25

## 2024-09-11 PROBLEM — R79.9 ABNORMAL FINDING OF BLOOD CHEMISTRY: Status: ACTIVE | Noted: 2024-09-11

## 2024-09-11 PROBLEM — R63.5 EXCESSIVE BODY WEIGHT GAIN: Status: ACTIVE | Noted: 2024-09-11

## 2024-09-11 PROBLEM — Z01.812 BLOOD TESTS PRIOR TO TREATMENT OR PROCEDURE: Status: ACTIVE | Noted: 2024-09-11

## 2024-09-11 PROBLEM — E61.8 INADEQUATE MINERAL INTAKE: Status: ACTIVE | Noted: 2024-09-11

## 2024-09-11 NOTE — ASSESSMENT
[FreeTextEntry1] : 49-year-old woman with a longstanding Hx of obesity presents for a weight loss medication follow-up. 6 lb weight loss since last visit ~3 months ago. Reports that she was feeling tingling throughout her body and self-discontinued topiramate a couple of weeks ago. Paresthesias have now resolved. Reports having mild constipation and takes Miralax with relief; no anxiety, palpitations, abdominal pain, nausea/vomiting, diarrhea, reflux/heartburn.  Nutrition and exercise guidelines were reviewed with the patient.    Continue 3 protein-focused meals/day (aim for 60 g - 70 g protein/day), avoid skipping meals Encourage zero calorie fluid intake (64 oz/day) Exercise with cardio (aim for 8k-10k steps/day) and strength training 2-3x/week Use step counter Weigh yourself 1x-2x/week Try the Calm rama or Soothing Pod rama for stress management Follow-up with nutritionist (keep a food log) Continue phentermine 37.5 mg daily only   Reiterated to pt the warnings of pregnancy while on anti-obesity medication(s): - instructed pt to avoid planned pregnancy. Pt had a hysterectomy.   - advised pt to stop anti-obesity medications immediately if becomes pregnant  - call the office Pt verbalizes understanding of the above  Call the office right away with any side effects. Last blood work 4/2024 (Noted with BELEN, Suresh), next due 10/2024. Ordered at today's visit.    All questions answered   Call with any questions or concerns Time before and after visit spent reviewing chart

## 2024-09-11 NOTE — HISTORY OF PRESENT ILLNESS
[de-identified] : 49-year-old woman with a longstanding Hx of obesity presents for a weight loss medication follow-up. 6 lb weight loss since last visit ~3 months ago. Reports that she was feeling tingling throughout her body and self-discontinued topiramate a couple of weeks ago. Paresthesias have now resolved. Reports having mild constipation and takes Miralax with relief; no anxiety, palpitations, abdominal pain, nausea/vomiting, diarrhea, reflux/heartburn. She reports that phentermine is helping to curb her appetite well. Patient trying to eat 3 protein-rich meals/day, drinking adequate zero-calorie fluids/day, and exercising by walking 40 minutes 5x per week and strength training. Sleeping 7-8 hrs per night.   Weight at Initial Consult: 193 lbs Current Weight: 176 lbs Anti-Obesity Medication(s): Phentermine (discontinued Topiramate due to parestheias) Start Date: 3/17/24. Has been on phentermine monotherapy starting 9/2024. Will need a holiday in 3/2025. Current Dose: Phentermine 37.5 mg/day Side-Effects from Anti-Obesity Medication(s): none on phentermine Obesity Comorbidities: Pre-diabetes, HLD Comorbidities Improved/Resolved: N/A History of Bariatric Surgery: No

## 2024-09-11 NOTE — PHYSICAL EXAM
[Obese, well nourished, in no acute distress] : obese, well nourished, in no acute distress [Normal] : affect appropriate [de-identified] : Equal chest rise, non-labored respirations, no audible wheezing.  [de-identified] : soft, NT, ND, no evidence of umbilical hernia or diastasis [de-identified] : ROSIE

## 2024-09-11 NOTE — PHYSICAL EXAM
[Obese, well nourished, in no acute distress] : obese, well nourished, in no acute distress [Normal] : affect appropriate [de-identified] : Equal chest rise, non-labored respirations, no audible wheezing.  [de-identified] : soft, NT, ND, no evidence of umbilical hernia or diastasis [de-identified] : ROSIE

## 2024-09-11 NOTE — HISTORY OF PRESENT ILLNESS
[de-identified] : 49-year-old woman with a longstanding Hx of obesity presents for a weight loss medication follow-up. 6 lb weight loss since last visit ~3 months ago. Reports that she was feeling tingling throughout her body and self-discontinued topiramate a couple of weeks ago. Paresthesias have now resolved. Reports having mild constipation and takes Miralax with relief; no anxiety, palpitations, abdominal pain, nausea/vomiting, diarrhea, reflux/heartburn. She reports that phentermine is helping to curb her appetite well. Patient trying to eat 3 protein-rich meals/day, drinking adequate zero-calorie fluids/day, and exercising by walking 40 minutes 5x per week and strength training. Sleeping 7-8 hrs per night.   Weight at Initial Consult: 193 lbs Current Weight: 176 lbs Anti-Obesity Medication(s): Phentermine (discontinued Topiramate due to parestheias) Start Date: 3/17/24. Has been on phentermine monotherapy starting 9/2024. Will need a holiday in 3/2025. Current Dose: Phentermine 37.5 mg/day Side-Effects from Anti-Obesity Medication(s): none on phentermine Obesity Comorbidities: Pre-diabetes, HLD Comorbidities Improved/Resolved: N/A History of Bariatric Surgery: No

## 2024-09-29 LAB
ALBUMIN SERPL ELPH-MCNC: 4.3 G/DL
ALP BLD-CCNC: 60 U/L
ALT SERPL-CCNC: 18 U/L
ANION GAP SERPL CALC-SCNC: 12 MMOL/L
AST SERPL-CCNC: 16 U/L
BILIRUB DIRECT SERPL-MCNC: 0.1 MG/DL
BILIRUB INDIRECT SERPL-MCNC: 0.2 MG/DL
BILIRUB SERPL-MCNC: 0.3 MG/DL
BUN SERPL-MCNC: 11 MG/DL
C PEPTIDE SERPL-MCNC: 3.9 NG/ML
CALCIUM SERPL-MCNC: 9.3 MG/DL
CHLORIDE SERPL-SCNC: 105 MMOL/L
CHOLEST SERPL-MCNC: 248 MG/DL
CO2 SERPL-SCNC: 22 MMOL/L
CREAT SERPL-MCNC: 0.59 MG/DL
EGFR: 109 ML/MIN/1.73M2
ESTIMATED AVERAGE GLUCOSE: 120 MG/DL
GLUCOSE BS SERPL-MCNC: 105 MG/DL
GLUCOSE SERPL-MCNC: 110 MG/DL
HBA1C MFR BLD HPLC: 5.8 %
HDLC SERPL-MCNC: 50 MG/DL
LDLC SERPL CALC-MCNC: 176 MG/DL
NONHDLC SERPL-MCNC: 198 MG/DL
POTASSIUM SERPL-SCNC: 4.6 MMOL/L
PROT SERPL-MCNC: 6.8 G/DL
SODIUM SERPL-SCNC: 138 MMOL/L
T4 FREE SERPL-MCNC: 1.5 NG/DL
THYROPEROXIDASE AB SERPL IA-ACNC: 156 IU/ML
TRIGL SERPL-MCNC: 120 MG/DL
TSH SERPL-ACNC: 0.99 UIU/ML

## 2024-10-08 ENCOUNTER — APPOINTMENT (OUTPATIENT)
Dept: PLASTIC SURGERY | Facility: CLINIC | Age: 49
End: 2024-10-08
Payer: MEDICAID

## 2024-10-08 DIAGNOSIS — Z42.1 ENCOUNTER FOR BREAST RECONSTRUCTION FOLLOWING MASTECTOMY: ICD-10-CM

## 2024-10-08 DIAGNOSIS — N65.0 DEFORMITY OF RECONSTRUCTED BREAST: ICD-10-CM

## 2024-10-08 PROCEDURE — 99212 OFFICE O/P EST SF 10 MIN: CPT

## 2024-10-15 ENCOUNTER — APPOINTMENT (OUTPATIENT)
Dept: ENDOCRINOLOGY | Facility: CLINIC | Age: 49
End: 2024-10-15
Payer: MEDICAID

## 2024-10-15 VITALS
HEIGHT: 63 IN | WEIGHT: 172 LBS | DIASTOLIC BLOOD PRESSURE: 81 MMHG | RESPIRATION RATE: 16 BRPM | TEMPERATURE: 97.3 F | SYSTOLIC BLOOD PRESSURE: 121 MMHG | OXYGEN SATURATION: 96 % | BODY MASS INDEX: 30.48 KG/M2 | HEART RATE: 86 BPM

## 2024-10-15 DIAGNOSIS — E03.9 HYPOTHYROIDISM, UNSPECIFIED: ICD-10-CM

## 2024-10-15 DIAGNOSIS — E78.5 HYPERLIPIDEMIA, UNSPECIFIED: ICD-10-CM

## 2024-10-15 DIAGNOSIS — R73.03 PREDIABETES.: ICD-10-CM

## 2024-10-15 PROCEDURE — G2211 COMPLEX E/M VISIT ADD ON: CPT | Mod: NC

## 2024-10-15 PROCEDURE — 99204 OFFICE O/P NEW MOD 45 MIN: CPT

## 2024-10-21 ENCOUNTER — APPOINTMENT (OUTPATIENT)
Dept: BARIATRICS | Facility: CLINIC | Age: 49
End: 2024-10-21
Payer: MEDICAID

## 2024-10-21 VITALS — WEIGHT: 172 LBS | HEIGHT: 63 IN | BODY MASS INDEX: 30.48 KG/M2

## 2024-10-21 DIAGNOSIS — R63.4 ABNORMAL WEIGHT LOSS: ICD-10-CM

## 2024-10-21 DIAGNOSIS — E66.9 OBESITY, UNSPECIFIED: ICD-10-CM

## 2024-10-21 PROCEDURE — 99214 OFFICE O/P EST MOD 30 MIN: CPT | Mod: 95

## 2024-10-21 RX ORDER — ATORVASTATIN CALCIUM 10 MG/1
10 TABLET, FILM COATED ORAL
Refills: 0 | Status: ACTIVE | COMMUNITY

## 2024-10-28 ENCOUNTER — APPOINTMENT (OUTPATIENT)
Dept: ENDOCRINOLOGY | Facility: CLINIC | Age: 49
End: 2024-10-28
Payer: MEDICAID

## 2024-10-28 VITALS
SYSTOLIC BLOOD PRESSURE: 122 MMHG | TEMPERATURE: 97 F | OXYGEN SATURATION: 98 % | HEART RATE: 78 BPM | RESPIRATION RATE: 16 BRPM | HEIGHT: 63 IN | BODY MASS INDEX: 30.48 KG/M2 | DIASTOLIC BLOOD PRESSURE: 83 MMHG | WEIGHT: 172 LBS

## 2024-10-28 DIAGNOSIS — E03.9 HYPOTHYROIDISM, UNSPECIFIED: ICD-10-CM

## 2024-10-28 DIAGNOSIS — R73.03 PREDIABETES.: ICD-10-CM

## 2024-10-28 PROCEDURE — G2211 COMPLEX E/M VISIT ADD ON: CPT | Mod: NC

## 2024-10-28 PROCEDURE — 99214 OFFICE O/P EST MOD 30 MIN: CPT

## 2025-01-28 ENCOUNTER — APPOINTMENT (OUTPATIENT)
Dept: ENDOCRINOLOGY | Facility: CLINIC | Age: 50
End: 2025-01-28
Payer: MEDICAID

## 2025-01-28 VITALS
RESPIRATION RATE: 16 BRPM | OXYGEN SATURATION: 98 % | WEIGHT: 175 LBS | BODY MASS INDEX: 31.01 KG/M2 | HEIGHT: 63 IN | SYSTOLIC BLOOD PRESSURE: 127 MMHG | HEART RATE: 90 BPM | TEMPERATURE: 97.3 F | DIASTOLIC BLOOD PRESSURE: 84 MMHG

## 2025-01-28 DIAGNOSIS — R73.03 PREDIABETES.: ICD-10-CM

## 2025-01-28 DIAGNOSIS — E03.9 HYPOTHYROIDISM, UNSPECIFIED: ICD-10-CM

## 2025-01-28 PROCEDURE — 99214 OFFICE O/P EST MOD 30 MIN: CPT

## 2025-01-28 PROCEDURE — G2211 COMPLEX E/M VISIT ADD ON: CPT | Mod: NC

## 2025-02-09 LAB
ALBUMIN SERPL ELPH-MCNC: 4.5 G/DL
ALP BLD-CCNC: 59 U/L
ALT SERPL-CCNC: 18 U/L
ANION GAP SERPL CALC-SCNC: 14 MMOL/L
AST SERPL-CCNC: 16 U/L
BILIRUB DIRECT SERPL-MCNC: 0.1 MG/DL
BILIRUB INDIRECT SERPL-MCNC: 0.2 MG/DL
BILIRUB SERPL-MCNC: 0.4 MG/DL
BUN SERPL-MCNC: 12 MG/DL
C PEPTIDE SERPL-MCNC: 2.8 NG/ML
CALCIUM SERPL-MCNC: 9.3 MG/DL
CHLORIDE SERPL-SCNC: 102 MMOL/L
CHOLEST SERPL-MCNC: 190 MG/DL
CO2 SERPL-SCNC: 22 MMOL/L
CREAT SERPL-MCNC: 0.55 MG/DL
EGFR: 111 ML/MIN/1.73M2
ESTIMATED AVERAGE GLUCOSE: 131 MG/DL
GLUCOSE BS SERPL-MCNC: 92 MG/DL
GLUCOSE SERPL-MCNC: 99 MG/DL
HBA1C MFR BLD HPLC: 6.2 %
HDLC SERPL-MCNC: 50 MG/DL
LDLC SERPL CALC-MCNC: 110 MG/DL
NONHDLC SERPL-MCNC: 140 MG/DL
POTASSIUM SERPL-SCNC: 4.3 MMOL/L
PROT SERPL-MCNC: 6.7 G/DL
SODIUM SERPL-SCNC: 138 MMOL/L
T4 FREE SERPL-MCNC: 1.7 NG/DL
TRIGL SERPL-MCNC: 173 MG/DL
TSH SERPL-ACNC: 0.54 UIU/ML

## 2025-04-29 ENCOUNTER — APPOINTMENT (OUTPATIENT)
Dept: PLASTIC SURGERY | Facility: CLINIC | Age: 50
End: 2025-04-29
Payer: MEDICAID

## 2025-04-29 DIAGNOSIS — Z98.890 OTHER SPECIFIED POSTPROCEDURAL STATES: ICD-10-CM

## 2025-04-29 DIAGNOSIS — N65.0 DEFORMITY OF RECONSTRUCTED BREAST: ICD-10-CM

## 2025-04-29 PROCEDURE — 99214 OFFICE O/P EST MOD 30 MIN: CPT

## (undated) DEVICE — DRAPE PROBE COVER 5" X 96"

## (undated) DEVICE — WARMING BLANKET LOWER ADULT

## (undated) DEVICE — DRSG PAD CAST FOAM RESTON 7-7/8"X11.75"

## (undated) DEVICE — NDL HYPO SAFE 20G X 1.5" (YELLOW)

## (undated) DEVICE — BIPOLAR FORCEP KIRWAN JEWELERS STR 4" X 0.4MM W 12FT CORD (GREEN)

## (undated) DEVICE — DRSG MASTISOL

## (undated) DEVICE — VENODYNE/SCD SLEEVE CALF MEDIUM

## (undated) DEVICE — DRSG SURGICAL BRA LG 38-40

## (undated) DEVICE — MARKING PEN W RULER

## (undated) DEVICE — DRAPE MAYO STAND 23"

## (undated) DEVICE — DRSG TEGADERM 6"X8"

## (undated) DEVICE — SUT PROLENE 2-0 30" CT-2

## (undated) DEVICE — SUT ETHILON 5-0 18" P-3 UNDYED

## (undated) DEVICE — SUT MONOCRYL 3-0 27" PS-2 UNDYED

## (undated) DEVICE — NDL HYPO SAFE 25G X 5/8" (ORANGE)

## (undated) DEVICE — SUT NYLON 9-0 5" DRM5

## (undated) DEVICE — SUT QUILL POLYPROPYLENE 2 24CM 36MM

## (undated) DEVICE — ELCTR BOVIE TIP BLADE INSULATED 2.75" EDGE

## (undated) DEVICE — ABDOMINAL BINDER MED/LG 9" X 36"-64"

## (undated) DEVICE — CANNULA ANT CHMBR 27GX22MM

## (undated) DEVICE — DRAIN JACKSON PRATT 15FR ROUND END W TROCAR

## (undated) DEVICE — SUT MONOCRYL 3-0 18" PS-2 UNDYED

## (undated) DEVICE — SUT NYLON 8-0 5" DRM6

## (undated) DEVICE — PACK UPPER BODY

## (undated) DEVICE — LONE STAR ELASTIC STAY HOOK 12MM BLUNT

## (undated) DEVICE — Device

## (undated) DEVICE — SUT NYLON 11-0 4" DRM4

## (undated) DEVICE — DRSG DERMABOND 0.7ML

## (undated) DEVICE — POSITIONER FOAM EGG CRATE ULNAR 2PCS (PINK)

## (undated) DEVICE — SYR LUER LOK 3CC

## (undated) DEVICE — SUT ETHILON 5-0 18" P-3

## (undated) DEVICE — TUBING MICROAIRE ASPIRATION SET 12FT

## (undated) DEVICE — SOL ANTI FOG

## (undated) DEVICE — SUT STRATAFIX SPIRAL MONOCRYL PLUS 4-0 14CM PS-2 UNDYED

## (undated) DEVICE — DRSG DERMABOND PRINEO 60CM

## (undated) DEVICE — BLADE SURGICAL #15 CARBON

## (undated) DEVICE — DRSG TELFA 3 X 8

## (undated) DEVICE — SUT QUILL MONODERM 3-0 30CM 19MM

## (undated) DEVICE — DRAPE MAYO STAND 30"

## (undated) DEVICE — SUT NYLON 9-0 5" HSV6

## (undated) DEVICE — DRSG GAUZE MOISTURIZER 0.5 OZ 4X8

## (undated) DEVICE — DRAIN RESERVOIR FOR JACKSON PRATT 100CC CARDINAL

## (undated) DEVICE — WARMING BLANKET FULL UNDERBODY

## (undated) DEVICE — BLADE SCALPEL SAFETY #15 WITH PLASTIC GREEN HANDLE

## (undated) DEVICE — GLV 8 PROTEXIS (WHITE)

## (undated) DEVICE — TONGUE DEPRESSOR

## (undated) DEVICE — DRSG PREVENA PLUS SYSTEM

## (undated) DEVICE — GOWN ROYAL SILK XL

## (undated) DEVICE — BLADE SURGICAL #10 CARBON

## (undated) DEVICE — DRAPE MEDIUM SHEET 44" X 70"

## (undated) DEVICE — PACK LIPECTOMY

## (undated) DEVICE — SUT VICRYL 2-0 27" CT-1 UNDYED

## (undated) DEVICE — FOLEY TRAY 16FR 5CC LF UMETER CLOSED

## (undated) DEVICE — NDL NERVE STIM 22GA X 2IN 30 DEG

## (undated) DEVICE — CLAMP MICROVASCULAR SINGLE  1-2MM

## (undated) DEVICE — LAP PAD 12 X 12"

## (undated) DEVICE — CANISTER SPECIMEN CONVERTOR PLASTIC

## (undated) DEVICE — SUT ETHIBOND 0 30" CT-1 GREEN

## (undated) DEVICE — ELCTR BOVIE TIP BLADE MEGADYNE E-Z CLEAN 4" EXTENDED

## (undated) DEVICE — SUT SILK 2-0 30" PSL

## (undated) DEVICE — STAPLER SKIN PROXIMATE

## (undated) DEVICE — ELCTR BOVIE TIP NEEDLE INSULATED 2.8" EDGE

## (undated) DEVICE — NDL HYPO SAFE 25G X 1.5" (ORANGE)

## (undated) DEVICE — SUT VICRYL 3-0 27" PS-2 UNDYED

## (undated) DEVICE — SUT QUILL PDO 2 36CM 48MM

## (undated) DEVICE — DOPPLER PROBE  CABLE

## (undated) DEVICE — SUT VICRYL PLUS 4-0 27" FS-1 UNDYED

## (undated) DEVICE — DRAPE SPLIT SHEET 77" X 120"

## (undated) DEVICE — ELCTR GROUNDING PAD ADULT COVIDIEN

## (undated) DEVICE — MERCIAN VISABILITY BACKROUND GREEN

## (undated) DEVICE — SUT MONOCRYL 4-0 18" P-3 UNDYED

## (undated) DEVICE — SUT PDS II 2-0 27" CT-1

## (undated) DEVICE — DRAPE TOWEL BLUE 17" X 24"

## (undated) DEVICE — SUT VICRYL 2-0 27" CT-1

## (undated) DEVICE — SUT VICRYL PLUS 3-0 27" PS-2 UNDYED

## (undated) DEVICE — DRSG TEGADERM 4X4.75

## (undated) DEVICE — SUT PLAIN GUT FAST ABSORBING 5-0 PC-1

## (undated) DEVICE — BAG SPONGE COUNTER EZ

## (undated) DEVICE — SUT PROLENE 4-0 18" PS-2

## (undated) DEVICE — SUT PLAIN GUT 4-0 27" FS-2

## (undated) DEVICE — DRSG GAUZE SPONGE 2X2" STERILE

## (undated) DEVICE — NDL HYPO REGULAR BEVEL 25G X 1.5" (BLUE)

## (undated) DEVICE — GLV 8 PROTEXIS (CREAM) MICRO

## (undated) DEVICE — SUT MONOCRYL 4-0 18" PS-2

## (undated) DEVICE — ELCTR BOVIE PENCIL HANDPIECE ROCKER SWITCH 15FT

## (undated) DEVICE — SYR LUER LOK 10CC

## (undated) DEVICE — SPEAR SURG EYE WECK-CELL CELOS

## (undated) DEVICE — PREP CHLORAPREP HI-LITE ORANGE 26ML

## (undated) DEVICE — VISTASEAL DUAL APPLICATOR

## (undated) DEVICE — CLAMP DBL VENOUS SM 20G/MM2

## (undated) DEVICE — PACK ABDOMINOPLASTY

## (undated) DEVICE — MEDTRONIC RADIALUX LIGHTED RETRACTOR DISP

## (undated) DEVICE — DRAPE LIGHT HANDLE COVER (BLUE)

## (undated) DEVICE — PACK BREAST RECONSTRUCTION

## (undated) DEVICE — SUT PDS II 0 27" CT-1

## (undated) DEVICE — SUT MONOCRYL 3-0 18" PS-1

## (undated) DEVICE — ELCTR STRYKER NEPTUNE SMOKE EVACUATION PENCIL (GREEN)

## (undated) DEVICE — SUT VICRYL PLUS 2-0 27" CT-1 UNDYED

## (undated) DEVICE — ABDOMINAL BINDER MED/LG 12" X 45"-62"

## (undated) DEVICE — SYR ASEPTO

## (undated) DEVICE — SUT STRATAFIX SPIRAL MONOCRYL PLUS 3-0 30CM PS-2 UNDYED

## (undated) DEVICE — SUCTION YANKAUER BULBOUS TIP W VENT

## (undated) DEVICE — TUBING Y INFILTRATION

## (undated) DEVICE — LAP PAD 18 X 18"

## (undated) DEVICE — WARMING BLANKET UPPER ADULT

## (undated) DEVICE — SPONGE SURGICAL STRIP 1" X 6"

## (undated) DEVICE — GLV 7.5 PROTEXIS (WHITE)

## (undated) DEVICE — DRAIN PENROSE .25" X 18" LATEX

## (undated) DEVICE — SYR LUER LOK 30CC

## (undated) DEVICE — GEL AQUSNC PACKET 20GR

## (undated) DEVICE — DRAPE SPLIT SHEET 77" X 108"

## (undated) DEVICE — FRAZIER SUCTION TIP 10FR

## (undated) DEVICE — SYR LUER LOK 5CC

## (undated) DEVICE — DRAPE SURGICAL #1010